# Patient Record
Sex: MALE | Race: WHITE | Employment: FULL TIME | URBAN - METROPOLITAN AREA
[De-identification: names, ages, dates, MRNs, and addresses within clinical notes are randomized per-mention and may not be internally consistent; named-entity substitution may affect disease eponyms.]

---

## 2017-01-28 DIAGNOSIS — F41.1 GENERALIZED ANXIETY DISORDER: Primary | ICD-10-CM

## 2017-01-29 NOTE — TELEPHONE ENCOUNTER
CITALOPRAM 20 MG TABLETS     Last Written Prescription Date: 12/20/2016  Last Fill Quantity: 30, # refills: 0  Last Office Visit with G primary care provider:  5/13/2016 DARRYL HOLLEY        Last PHQ-9 score on record= No flowsheet data found.

## 2017-01-30 RX ORDER — CITALOPRAM HYDROBROMIDE 20 MG/1
20 TABLET ORAL DAILY
Qty: 30 TABLET | Refills: 0 | Status: CANCELLED | OUTPATIENT
Start: 2017-01-30

## 2017-01-30 NOTE — TELEPHONE ENCOUNTER
"Per 5/13/2016 visit \"Decrease celexa to 10mg by mouth daily for 1 month. If anxiety symptoms are stable, could consider discontinuing after that time.  Could consider 5mg dose for one month if symptoms are uncomfortable discontinuing from 10mg daily.\" when did you want her to follow up?  "

## 2017-01-30 NOTE — TELEPHONE ENCOUNTER
Please clarify if patient is actually taking this medication.    Chris Williamson MD  Family Medicine Physician

## 2017-02-21 NOTE — TELEPHONE ENCOUNTER
Pt is taking citalopram. He works for [employer]. He had an appt there for a wellness checkup beginning of February. It was filled for 3 months.     He agrees to make appt with Dr Noe Williamson before he runs out of citalopram and still considers him his PCP.    Pretty Reagan, RN, BSN

## 2017-03-07 ENCOUNTER — TRANSFERRED RECORDS (OUTPATIENT)
Dept: HEALTH INFORMATION MANAGEMENT | Facility: CLINIC | Age: 37
End: 2017-03-07

## 2017-03-20 ENCOUNTER — TRANSFERRED RECORDS (OUTPATIENT)
Dept: HEALTH INFORMATION MANAGEMENT | Facility: CLINIC | Age: 37
End: 2017-03-20

## 2017-03-24 ENCOUNTER — TRANSFERRED RECORDS (OUTPATIENT)
Dept: HEALTH INFORMATION MANAGEMENT | Facility: CLINIC | Age: 37
End: 2017-03-24

## 2017-04-24 ENCOUNTER — OFFICE VISIT (OUTPATIENT)
Dept: FAMILY MEDICINE | Facility: CLINIC | Age: 37
End: 2017-04-24
Payer: OTHER MISCELLANEOUS

## 2017-04-24 VITALS
RESPIRATION RATE: 16 BRPM | WEIGHT: 200 LBS | OXYGEN SATURATION: 99 % | DIASTOLIC BLOOD PRESSURE: 76 MMHG | TEMPERATURE: 98 F | SYSTOLIC BLOOD PRESSURE: 110 MMHG | HEIGHT: 68 IN | BODY MASS INDEX: 30.31 KG/M2 | HEART RATE: 72 BPM

## 2017-04-24 DIAGNOSIS — M51.9 LUMBAR DISC DISEASE: ICD-10-CM

## 2017-04-24 DIAGNOSIS — Z01.818 PREOP GENERAL PHYSICAL EXAM: Primary | ICD-10-CM

## 2017-04-24 PROCEDURE — 99214 OFFICE O/P EST MOD 30 MIN: CPT | Performed by: FAMILY MEDICINE

## 2017-04-24 NOTE — PROGRESS NOTES
[address] [address]  [phone]  Dept: [phone]    PRE-OP EVALUATION:  Today's date: 2017    Emile Navas (: 1980) presents for pre-operative evaluation assessment as requested by Rogelio Paiz.  He requires evaluation and anesthesia risk assessment prior to undergoing surgery/procedure for treatment of Laminectomy Lumbar One Level .  Proposed procedure: Revision right L5-S1 Disectomy (Alejo Oleary, C-ARM)        Date of Surgery/ Procedure: 2017  Time of Surgery/ Procedure: 9:00 AM  Hospital/Surgical Facility: [facility]  Fax number for surgical facility: N/A  Primary Physician: Chris Mackey  Type of Anesthesia Anticipated: General    Patient has a Health Care Directive or Living Will:  NO    Preop Questions 2017   1.  Do you have a history of heart attack, stroke, stent, bypass or surgery on an artery in the head, neck, heart or legs? No   2.  Do you ever have any pain or discomfort in your chest? No   3.  Do you have a history of  Heart Failure? No   4.   Are you troubled by shortness of breath when:  walking on a level surface, or up a slight hill, or at night? No   5.  Do you currently have a cold, bronchitis or other respiratory infection? No   6.  Do you have a cough, shortness of breath, or wheezing? No   7.  Do you sometimes get pains in the calves of your legs when you walk? No   8. Do you or anyone in your family have previous history of blood clots? No   9.  Do you or does anyone in your family have a serious bleeding problem such as prolonged bleeding following surgeries or cuts? No   10. Have you ever had problems with anemia or been told to take iron pills? No   11. Have you had any abnormal blood loss such as black, tarry or bloody stools? No   12. Have you ever had a blood transfusion? No   13. Have you or any of your relatives ever had problems with anesthesia? No   14. Do you have sleep  apnea, excessive snoring or daytime drowsiness? No   15. Do you have any prosthetic heart valves? No   16. Do you have prosthetic joints? No         HPI:                                                      Brief HPI related to upcoming procedure:   Was injured when lifting a women from a roadway 2/20/2017. Hasn't been the same since.        MEDICAL HISTORY:                                                      Patient Active Problem List    Diagnosis Date Noted     Lumbar disc disease 02/04/2016     Priority: Medium     Generalized anxiety disorder 12/18/2014     Priority: Medium     Diagnosis updated by automated process. Provider to review and confirm.        History reviewed. No pertinent past medical history.  History reviewed. No pertinent surgical history.  Current Outpatient Prescriptions   Medication Sig Dispense Refill     traMADol (ULTRAM) 50 MG tablet Take 0.5-1 tablets (25-50 mg) by mouth daily as needed for moderate pain 30 tablet 0     citalopram (CELEXA) 20 MG tablet Take 1 tablet (20 mg) by mouth daily 30 tablet 11     triamcinolone (KENALOG) 0.1 % cream Apply sparingly to affected area three times daily for 14 days. (Patient not taking: Reported on 4/24/2017) 30 g 5     OTC products: None, except as noted above    No Known Allergies   Latex Allergy: NO    Social History   Substance Use Topics     Smoking status: Never Smoker     Smokeless tobacco: Not on file     Alcohol use Yes      Comment: rarely     History   Drug Use No       REVIEW OF SYSTEMS:                                                    C: NEGATIVE for fever, chills, change in weight  I: NEGATIVE for worrisome rashes, moles or lesions  E: NEGATIVE for vision changes or irritation  E/M: NEGATIVE for ear, mouth and throat problems  R: NEGATIVE for significant cough or SOB  CV: NEGATIVE for chest pain, palpitations or peripheral edema  GI: NEGATIVE for nausea, abdominal pain, heartburn, or change in bowel habits  : NEGATIVE for  "frequency, dysuria, or hematuria  M: Back pain, and ongoing right wrist pain.  N: Does have weakness in his right leg.  H: NEGATIVE for bleeding problems  P: NEGATIVE for changes in mood or affect    EXAM:                                                    /76 (BP Location: Left arm, Patient Position: Chair, Cuff Size: Adult Large)  Pulse 72  Temp 98  F (36.7  C) (Oral)  Resp 16  Ht 5' 7.5\" (1.715 m)  Wt 200 lb (90.7 kg)  SpO2 99%  BMI 30.86 kg/m2    GENERAL APPEARANCE: healthy, alert and no distress     EYES: EOMI,  PERRL     HENT: ear canals and TM's normal and nose and mouth without ulcers or lesions     NECK: no adenopathy, no asymmetry, masses, or scars and thyroid normal to palpation     RESP: lungs clear to auscultation - no rales, rhonchi or wheezes     CV: regular rates and rhythm, normal S1 S2, no S3 or S4 and no murmur, click or rub     ABDOMEN:  soft, nontender, no HSM or masses and bowel sounds normal     MS: extremities normal- no gross deformities noted, no evidence of inflammation in joints, FROM in all extremities.     SKIN: no suspicious lesions or rashes     NEURO: Normal strength and tone, sensory exam grossly normal, mentation intact and speech normal     PSYCH: mentation appears normal. and affect normal/bright     LYMPHATICS: No axillary, cervical, or supraclavicular nodes    DIAGNOSTICS:                                                    No labs or EKG required for low risk surgery (cataract, skin procedure, breast biopsy, etc)    Recent Labs   Lab Test  03/03/16   1510  09/04/11   0744  09/03/11   0145   HGB  14.9  14.6  14.1   PLT   --   225  229   NA   --   142  144   POTASSIUM   --   4.4  3.7   CR   --   0.90  0.90        IMPRESSION:                                                    Reason for surgery/procedure: radiculopathy secondary to disc bulge  Diagnosis/reason for consult: preop    The proposed surgical procedure is considered INTERMEDIATE risk.    REVISED CARDIAC " RISK INDEX  The patient has the following serious cardiovascular risks for perioperative complications such as (MI, PE, VFib and 3  AV Block):  No serious cardiac risks  INTERPRETATION: 1 risks: Class II (low risk - 0.9% complication rate)    The patient has the following additional risks for perioperative complications:  No identified additional risks      ICD-10-CM    1. Preop general physical exam Z01.818    2. Lumbar disc disease M51.9        RECOMMENDATIONS:                                                      APPROVAL GIVEN to proceed with proposed procedure, without further diagnostic evaluation       Signed Electronically by: Chris Williamson MD    Copy of this evaluation report is provided to requesting physician.    [facility] Preop Guidelines

## 2017-04-24 NOTE — NURSING NOTE
"Chief Complaint   Patient presents with     Pre-Op Exam       Initial /76 (BP Location: Left arm, Patient Position: Chair, Cuff Size: Adult Large)  Pulse 72  Temp 98  F (36.7  C) (Oral)  Resp 16  Ht 5' 7.5\" (1.715 m)  Wt 200 lb (90.7 kg)  SpO2 99%  BMI 30.86 kg/m2 Estimated body mass index is 30.86 kg/(m^2) as calculated from the following:    Height as of this encounter: 5' 7.5\" (1.715 m).    Weight as of this encounter: 200 lb (90.7 kg).  Medication Reconciliation: complete     Kim Galaviz CMA      "

## 2017-04-24 NOTE — MR AVS SNAPSHOT
After Visit Summary   4/24/2017    Emile Navas    MRN: [id number]           Patient Information     Date Of Birth          1980        Visit Information        Provider Department      4/24/2017 11:00 AM Chris Mackey MD [facility]        Today's Diagnoses     Preop general physical exam    -  1    Lumbar disc disease          Care Instructions      Before Your Surgery      Call your surgeon if there is any change in your health. This includes signs of a cold or flu (such as a sore throat, runny nose, cough, rash or fever).    Do not smoke, drink alcohol or take over the counter medicine (unless your surgeon or primary care doctor tells you to) for the 24 hours before and after surgery.    If you take prescribed drugs: Follow your doctor s orders about which medicines to take and which to stop until after surgery.    Eating and drinking prior to surgery: follow the instructions from your surgeon    Take a shower or bath the night before surgery. Use the soap your surgeon gave you to gently clean your skin. If you do not have soap from your surgeon, use your regular soap. Do not shave or scrub the surgery site.  Wear clean pajamas and have clean sheets on your bed.         Follow-ups after your visit        Your next 10 appointments already scheduled     Apr 26, 2017   Procedure with Rogelio Dixon MD   [facility] (--)    [address]  [facility] [id number] [phone]              Who to contact     If you have questions or need follow up information about today's clinic visit or your schedule please contact [facility] directly at [phone].  Normal or non-critical lab and imaging results will be communicated to you by MyChart, letter or phone within 4 business days after the clinic has received the results. If you do not hear from us within 7 days, please contact the clinic through [employer]  "or phone. If you have a critical or abnormal lab result, we will notify you by phone as soon as possible.  Submit refill requests through [employer] or call your pharmacy and they will forward the refill request to us. Please allow 3 business days for your refill to be completed.          Additional Information About Your Visit        [employer] Information     [employer] gives you secure access to your electronic health record. If you see a primary care provider, you can also send messages to your care team and make appointments. If you have questions, please call your primary care clinic.  If you do not have a primary care provider, please call [phone] and they will assist you.        Care EveryWhere ID     This is your Care EveryWhere ID. This could be used by other organizations to access your [facility] medical records  [id number]        Your Vitals Were     Pulse Temperature Respirations Height Pulse Oximetry BMI (Body Mass Index)    72 98  F (36.7  C) (Oral) 16 5' 7.5\" (1.715 m) 99% 30.86 kg/m2       Blood Pressure from Last 3 Encounters:   04/24/17 110/76   05/13/16 128/84   03/03/16 133/81    Weight from Last 3 Encounters:   04/24/17 200 lb (90.7 kg)   05/13/16 208 lb (94.3 kg)   03/03/16 200 lb 3.2 oz (90.8 kg)              Today, you had the following     No orders found for display       Primary Care Provider Office Phone # Fax #    Chris Ruano Noe Williamson [phone]       [address]  [facility] [address]        Thank you!     Thank you for choosing [facility]  for your care. Our goal is always to provide you with excellent care. Hearing back from our patients is one way we can continue to improve our services. Please take a few minutes to complete the written survey that you may receive in the mail after your visit with us. Thank you!             Your Updated Medication List - Protect others around you: Learn how to safely use, " store and throw away your medicines at www.disposemymeds.org.          This list is accurate as of: 4/24/17  1:15 PM.  Always use your most recent med list.                   Brand Name Dispense Instructions for use    citalopram 20 MG tablet    celeXA    30 tablet    Take 1 tablet (20 mg) by mouth daily       traMADol 50 MG tablet    ULTRAM    30 tablet    Take 0.5-1 tablets (25-50 mg) by mouth daily as needed for moderate pain       triamcinolone 0.1 % cream    KENALOG    30 g    Apply sparingly to affected area three times daily for 14 days.

## 2017-04-25 NOTE — H&P (VIEW-ONLY)
[address] [address]  [phone]  Dept: [phone]    PRE-OP EVALUATION:  Today's date: 2017    Emile Navas (: 1980) presents for pre-operative evaluation assessment as requested by Rogelio Paiz.  He requires evaluation and anesthesia risk assessment prior to undergoing surgery/procedure for treatment of Laminectomy Lumbar One Level .  Proposed procedure: Revision right L5-S1 Disectomy (Alejo Oleary, C-ARM)        Date of Surgery/ Procedure: 2017  Time of Surgery/ Procedure: 9:00 AM  Hospital/Surgical Facility: [facility]  Fax number for surgical facility: N/A  Primary Physician: Chris Mackey  Type of Anesthesia Anticipated: General    Patient has a Health Care Directive or Living Will:  NO    Preop Questions 2017   1.  Do you have a history of heart attack, stroke, stent, bypass or surgery on an artery in the head, neck, heart or legs? No   2.  Do you ever have any pain or discomfort in your chest? No   3.  Do you have a history of  Heart Failure? No   4.   Are you troubled by shortness of breath when:  walking on a level surface, or up a slight hill, or at night? No   5.  Do you currently have a cold, bronchitis or other respiratory infection? No   6.  Do you have a cough, shortness of breath, or wheezing? No   7.  Do you sometimes get pains in the calves of your legs when you walk? No   8. Do you or anyone in your family have previous history of blood clots? No   9.  Do you or does anyone in your family have a serious bleeding problem such as prolonged bleeding following surgeries or cuts? No   10. Have you ever had problems with anemia or been told to take iron pills? No   11. Have you had any abnormal blood loss such as black, tarry or bloody stools? No   12. Have you ever had a blood transfusion? No   13. Have you or any of your relatives ever had problems with anesthesia? No   14. Do you have sleep  apnea, excessive snoring or daytime drowsiness? No   15. Do you have any prosthetic heart valves? No   16. Do you have prosthetic joints? No         HPI:                                                      Brief HPI related to upcoming procedure:   Was injured when lifting a women from a roadway 2/20/2017. Hasn't been the same since.        MEDICAL HISTORY:                                                      Patient Active Problem List    Diagnosis Date Noted     Lumbar disc disease 02/04/2016     Priority: Medium     Generalized anxiety disorder 12/18/2014     Priority: Medium     Diagnosis updated by automated process. Provider to review and confirm.        History reviewed. No pertinent past medical history.  History reviewed. No pertinent surgical history.  Current Outpatient Prescriptions   Medication Sig Dispense Refill     traMADol (ULTRAM) 50 MG tablet Take 0.5-1 tablets (25-50 mg) by mouth daily as needed for moderate pain 30 tablet 0     citalopram (CELEXA) 20 MG tablet Take 1 tablet (20 mg) by mouth daily 30 tablet 11     triamcinolone (KENALOG) 0.1 % cream Apply sparingly to affected area three times daily for 14 days. (Patient not taking: Reported on 4/24/2017) 30 g 5     OTC products: None, except as noted above    No Known Allergies   Latex Allergy: NO    Social History   Substance Use Topics     Smoking status: Never Smoker     Smokeless tobacco: Not on file     Alcohol use Yes      Comment: rarely     History   Drug Use No       REVIEW OF SYSTEMS:                                                    C: NEGATIVE for fever, chills, change in weight  I: NEGATIVE for worrisome rashes, moles or lesions  E: NEGATIVE for vision changes or irritation  E/M: NEGATIVE for ear, mouth and throat problems  R: NEGATIVE for significant cough or SOB  CV: NEGATIVE for chest pain, palpitations or peripheral edema  GI: NEGATIVE for nausea, abdominal pain, heartburn, or change in bowel habits  : NEGATIVE for  "frequency, dysuria, or hematuria  M: Back pain, and ongoing right wrist pain.  N: Does have weakness in his right leg.  H: NEGATIVE for bleeding problems  P: NEGATIVE for changes in mood or affect    EXAM:                                                    /76 (BP Location: Left arm, Patient Position: Chair, Cuff Size: Adult Large)  Pulse 72  Temp 98  F (36.7  C) (Oral)  Resp 16  Ht 5' 7.5\" (1.715 m)  Wt 200 lb (90.7 kg)  SpO2 99%  BMI 30.86 kg/m2    GENERAL APPEARANCE: healthy, alert and no distress     EYES: EOMI,  PERRL     HENT: ear canals and TM's normal and nose and mouth without ulcers or lesions     NECK: no adenopathy, no asymmetry, masses, or scars and thyroid normal to palpation     RESP: lungs clear to auscultation - no rales, rhonchi or wheezes     CV: regular rates and rhythm, normal S1 S2, no S3 or S4 and no murmur, click or rub     ABDOMEN:  soft, nontender, no HSM or masses and bowel sounds normal     MS: extremities normal- no gross deformities noted, no evidence of inflammation in joints, FROM in all extremities.     SKIN: no suspicious lesions or rashes     NEURO: Normal strength and tone, sensory exam grossly normal, mentation intact and speech normal     PSYCH: mentation appears normal. and affect normal/bright     LYMPHATICS: No axillary, cervical, or supraclavicular nodes    DIAGNOSTICS:                                                    No labs or EKG required for low risk surgery (cataract, skin procedure, breast biopsy, etc)    Recent Labs   Lab Test  03/03/16   1510  09/04/11   0744  09/03/11   0145   HGB  14.9  14.6  14.1   PLT   --   225  229   NA   --   142  144   POTASSIUM   --   4.4  3.7   CR   --   0.90  0.90        IMPRESSION:                                                    Reason for surgery/procedure: radiculopathy secondary to disc bulge  Diagnosis/reason for consult: preop    The proposed surgical procedure is considered INTERMEDIATE risk.    REVISED CARDIAC " RISK INDEX  The patient has the following serious cardiovascular risks for perioperative complications such as (MI, PE, VFib and 3  AV Block):  No serious cardiac risks  INTERPRETATION: 1 risks: Class II (low risk - 0.9% complication rate)    The patient has the following additional risks for perioperative complications:  No identified additional risks      ICD-10-CM    1. Preop general physical exam Z01.818    2. Lumbar disc disease M51.9        RECOMMENDATIONS:                                                      APPROVAL GIVEN to proceed with proposed procedure, without further diagnostic evaluation       Signed Electronically by: Chris Williamson MD    Copy of this evaluation report is provided to requesting physician.    [facility] Preop Guidelines

## 2017-04-26 ENCOUNTER — ANESTHESIA EVENT (OUTPATIENT)
Dept: SURGERY | Facility: CLINIC | Age: 37
End: 2017-04-26
Payer: OTHER MISCELLANEOUS

## 2017-04-26 ENCOUNTER — HOSPITAL ENCOUNTER (OUTPATIENT)
Facility: CLINIC | Age: 37
Discharge: HOME | End: 2017-04-26
Attending: ORTHOPAEDIC SURGERY | Admitting: ORTHOPAEDIC SURGERY
Payer: OTHER MISCELLANEOUS

## 2017-04-26 ENCOUNTER — ANESTHESIA (OUTPATIENT)
Dept: SURGERY | Facility: CLINIC | Age: 37
End: 2017-04-26
Payer: OTHER MISCELLANEOUS

## 2017-04-26 ENCOUNTER — APPOINTMENT (OUTPATIENT)
Dept: GENERAL RADIOLOGY | Facility: CLINIC | Age: 37
End: 2017-04-26
Attending: ORTHOPAEDIC SURGERY
Payer: OTHER MISCELLANEOUS

## 2017-04-26 VITALS
DIASTOLIC BLOOD PRESSURE: 62 MMHG | HEIGHT: 68 IN | BODY MASS INDEX: 30.31 KG/M2 | WEIGHT: 200 LBS | TEMPERATURE: 97.5 F | RESPIRATION RATE: 16 BRPM | SYSTOLIC BLOOD PRESSURE: 121 MMHG | OXYGEN SATURATION: 99 %

## 2017-04-26 DIAGNOSIS — Z98.890 S/P LUMBAR DISCECTOMY: Primary | ICD-10-CM

## 2017-04-26 PROCEDURE — 37000009 ZZH ANESTHESIA TECHNICAL FEE, EACH ADDTL 15 MIN: Performed by: ORTHOPAEDIC SURGERY

## 2017-04-26 PROCEDURE — 25000128 H RX IP 250 OP 636: Performed by: ANESTHESIOLOGY

## 2017-04-26 PROCEDURE — 71000027 ZZH RECOVERY PHASE 2 EACH 15 MINS: Performed by: ORTHOPAEDIC SURGERY

## 2017-04-26 PROCEDURE — 25000128 H RX IP 250 OP 636: Performed by: NURSE ANESTHETIST, CERTIFIED REGISTERED

## 2017-04-26 PROCEDURE — 27210995 ZZH RX 272: Performed by: ORTHOPAEDIC SURGERY

## 2017-04-26 PROCEDURE — 25800025 ZZH RX 258: Performed by: NURSE ANESTHETIST, CERTIFIED REGISTERED

## 2017-04-26 PROCEDURE — 25000132 ZZH RX MED GY IP 250 OP 250 PS 637: Performed by: ORTHOPAEDIC SURGERY

## 2017-04-26 PROCEDURE — 40000170 ZZH STATISTIC PRE-PROCEDURE ASSESSMENT II: Performed by: ORTHOPAEDIC SURGERY

## 2017-04-26 PROCEDURE — 25000128 H RX IP 250 OP 636: Performed by: PHYSICIAN ASSISTANT

## 2017-04-26 PROCEDURE — 37000008 ZZH ANESTHESIA TECHNICAL FEE, 1ST 30 MIN: Performed by: ORTHOPAEDIC SURGERY

## 2017-04-26 PROCEDURE — 25000125 ZZHC RX 250: Performed by: ORTHOPAEDIC SURGERY

## 2017-04-26 PROCEDURE — 71000012 ZZH RECOVERY PHASE 1 LEVEL 1 FIRST HR: Performed by: ORTHOPAEDIC SURGERY

## 2017-04-26 PROCEDURE — 36000065 ZZH SURGERY LEVEL 4 W FLUORO 1ST 30 MIN: Performed by: ORTHOPAEDIC SURGERY

## 2017-04-26 PROCEDURE — 25000128 H RX IP 250 OP 636: Performed by: ORTHOPAEDIC SURGERY

## 2017-04-26 PROCEDURE — 27210794 ZZH OR GENERAL SUPPLY STERILE: Performed by: ORTHOPAEDIC SURGERY

## 2017-04-26 PROCEDURE — 25000125 ZZHC RX 250: Performed by: NURSE ANESTHETIST, CERTIFIED REGISTERED

## 2017-04-26 PROCEDURE — 71000013 ZZH RECOVERY PHASE 1 LEVEL 1 EA ADDTL HR: Performed by: ORTHOPAEDIC SURGERY

## 2017-04-26 PROCEDURE — 25000566 ZZH SEVOFLURANE, EA 15 MIN: Performed by: ORTHOPAEDIC SURGERY

## 2017-04-26 PROCEDURE — 40000277 XR SURGERY CARM FLUORO LESS THAN 5 MIN W STILLS

## 2017-04-26 PROCEDURE — 36000063 ZZH SURGERY LEVEL 4 EA 15 ADDTL MIN: Performed by: ORTHOPAEDIC SURGERY

## 2017-04-26 RX ORDER — SODIUM CHLORIDE, SODIUM LACTATE, POTASSIUM CHLORIDE, CALCIUM CHLORIDE 600; 310; 30; 20 MG/100ML; MG/100ML; MG/100ML; MG/100ML
INJECTION, SOLUTION INTRAVENOUS CONTINUOUS
Status: DISCONTINUED | OUTPATIENT
Start: 2017-04-26 | End: 2017-04-26 | Stop reason: HOSPADM

## 2017-04-26 RX ORDER — DEXAMETHASONE SODIUM PHOSPHATE 4 MG/ML
INJECTION, SOLUTION INTRA-ARTICULAR; INTRALESIONAL; INTRAMUSCULAR; INTRAVENOUS; SOFT TISSUE PRN
Status: DISCONTINUED | OUTPATIENT
Start: 2017-04-26 | End: 2017-04-26

## 2017-04-26 RX ORDER — BETAMETHASONE SODIUM PHOSPHATE AND BETAMETHASONE ACETATE 3; 3 MG/ML; MG/ML
INJECTION, SUSPENSION INTRA-ARTICULAR; INTRALESIONAL; INTRAMUSCULAR; SOFT TISSUE PRN
Status: DISCONTINUED | OUTPATIENT
Start: 2017-04-26 | End: 2017-04-26 | Stop reason: HOSPADM

## 2017-04-26 RX ORDER — LIDOCAINE HYDROCHLORIDE 20 MG/ML
INJECTION, SOLUTION INFILTRATION; PERINEURAL PRN
Status: DISCONTINUED | OUTPATIENT
Start: 2017-04-26 | End: 2017-04-26

## 2017-04-26 RX ORDER — OXYCODONE HYDROCHLORIDE 5 MG/1
5 TABLET ORAL EVERY 4 HOURS PRN
Qty: 40 TABLET | Refills: 0 | Status: SHIPPED | OUTPATIENT
Start: 2017-04-26 | End: 2017-06-27

## 2017-04-26 RX ORDER — EPHEDRINE SULFATE 50 MG/ML
INJECTION, SOLUTION INTRAMUSCULAR; INTRAVENOUS; SUBCUTANEOUS PRN
Status: DISCONTINUED | OUTPATIENT
Start: 2017-04-26 | End: 2017-04-26

## 2017-04-26 RX ORDER — MEPERIDINE HYDROCHLORIDE 25 MG/ML
12.5 INJECTION INTRAMUSCULAR; INTRAVENOUS; SUBCUTANEOUS
Status: DISCONTINUED | OUTPATIENT
Start: 2017-04-26 | End: 2017-04-26 | Stop reason: HOSPADM

## 2017-04-26 RX ORDER — PROPOFOL 10 MG/ML
INJECTION, EMULSION INTRAVENOUS PRN
Status: DISCONTINUED | OUTPATIENT
Start: 2017-04-26 | End: 2017-04-26

## 2017-04-26 RX ORDER — ONDANSETRON 2 MG/ML
4 INJECTION INTRAMUSCULAR; INTRAVENOUS EVERY 30 MIN PRN
Status: DISCONTINUED | OUTPATIENT
Start: 2017-04-26 | End: 2017-04-26 | Stop reason: HOSPADM

## 2017-04-26 RX ORDER — FENTANYL CITRATE 0.05 MG/ML
25-50 INJECTION, SOLUTION INTRAMUSCULAR; INTRAVENOUS
Status: DISCONTINUED | OUTPATIENT
Start: 2017-04-26 | End: 2017-04-26 | Stop reason: HOSPADM

## 2017-04-26 RX ORDER — KETOROLAC TROMETHAMINE 30 MG/ML
30 INJECTION, SOLUTION INTRAMUSCULAR; INTRAVENOUS ONCE
Status: COMPLETED | OUTPATIENT
Start: 2017-04-26 | End: 2017-04-26

## 2017-04-26 RX ORDER — CEFAZOLIN SODIUM 1 G/3ML
1 INJECTION, POWDER, FOR SOLUTION INTRAMUSCULAR; INTRAVENOUS SEE ADMIN INSTRUCTIONS
Status: DISCONTINUED | OUTPATIENT
Start: 2017-04-26 | End: 2017-04-26 | Stop reason: HOSPADM

## 2017-04-26 RX ORDER — GLYCOPYRROLATE 0.2 MG/ML
INJECTION, SOLUTION INTRAMUSCULAR; INTRAVENOUS PRN
Status: DISCONTINUED | OUTPATIENT
Start: 2017-04-26 | End: 2017-04-26

## 2017-04-26 RX ORDER — ONDANSETRON 4 MG/1
4 TABLET, ORALLY DISINTEGRATING ORAL EVERY 30 MIN PRN
Status: DISCONTINUED | OUTPATIENT
Start: 2017-04-26 | End: 2017-04-26 | Stop reason: HOSPADM

## 2017-04-26 RX ORDER — CEFAZOLIN SODIUM 2 G/100ML
2 INJECTION, SOLUTION INTRAVENOUS
Status: COMPLETED | OUTPATIENT
Start: 2017-04-26 | End: 2017-04-26

## 2017-04-26 RX ORDER — HYDRALAZINE HYDROCHLORIDE 20 MG/ML
2.5-5 INJECTION INTRAMUSCULAR; INTRAVENOUS EVERY 10 MIN PRN
Status: DISCONTINUED | OUTPATIENT
Start: 2017-04-26 | End: 2017-04-26 | Stop reason: HOSPADM

## 2017-04-26 RX ORDER — NALOXONE HYDROCHLORIDE 0.4 MG/ML
.1-.4 INJECTION, SOLUTION INTRAMUSCULAR; INTRAVENOUS; SUBCUTANEOUS
Status: DISCONTINUED | OUTPATIENT
Start: 2017-04-26 | End: 2017-04-26 | Stop reason: HOSPADM

## 2017-04-26 RX ORDER — OXYCODONE HYDROCHLORIDE 5 MG/1
5 TABLET ORAL ONCE
Status: COMPLETED | OUTPATIENT
Start: 2017-04-26 | End: 2017-04-26

## 2017-04-26 RX ORDER — VECURONIUM BROMIDE 1 MG/ML
INJECTION, POWDER, LYOPHILIZED, FOR SOLUTION INTRAVENOUS PRN
Status: DISCONTINUED | OUTPATIENT
Start: 2017-04-26 | End: 2017-04-26

## 2017-04-26 RX ORDER — ONDANSETRON 2 MG/ML
INJECTION INTRAMUSCULAR; INTRAVENOUS PRN
Status: DISCONTINUED | OUTPATIENT
Start: 2017-04-26 | End: 2017-04-26

## 2017-04-26 RX ORDER — ALBUTEROL SULFATE 0.83 MG/ML
2.5 SOLUTION RESPIRATORY (INHALATION) EVERY 4 HOURS PRN
Status: DISCONTINUED | OUTPATIENT
Start: 2017-04-26 | End: 2017-04-26 | Stop reason: HOSPADM

## 2017-04-26 RX ORDER — SODIUM CHLORIDE, SODIUM LACTATE, POTASSIUM CHLORIDE, CALCIUM CHLORIDE 600; 310; 30; 20 MG/100ML; MG/100ML; MG/100ML; MG/100ML
INJECTION, SOLUTION INTRAVENOUS CONTINUOUS PRN
Status: DISCONTINUED | OUTPATIENT
Start: 2017-04-26 | End: 2017-04-26

## 2017-04-26 RX ORDER — NEOSTIGMINE METHYLSULFATE 1 MG/ML
VIAL (ML) INJECTION PRN
Status: DISCONTINUED | OUTPATIENT
Start: 2017-04-26 | End: 2017-04-26

## 2017-04-26 RX ORDER — FENTANYL CITRATE 50 UG/ML
INJECTION, SOLUTION INTRAMUSCULAR; INTRAVENOUS PRN
Status: DISCONTINUED | OUTPATIENT
Start: 2017-04-26 | End: 2017-04-26

## 2017-04-26 RX ADMIN — Medication 5 MG: at 09:47

## 2017-04-26 RX ADMIN — KETOROLAC TROMETHAMINE 30 MG: 30 INJECTION, SOLUTION INTRAMUSCULAR at 10:49

## 2017-04-26 RX ADMIN — NEOSTIGMINE METHYLSULFATE 5 MG: 1 INJECTION INTRAMUSCULAR; INTRAVENOUS; SUBCUTANEOUS at 10:21

## 2017-04-26 RX ADMIN — ONDANSETRON 4 MG: 2 INJECTION INTRAMUSCULAR; INTRAVENOUS at 10:11

## 2017-04-26 RX ADMIN — ROCURONIUM BROMIDE 50 MG: 10 INJECTION INTRAVENOUS at 08:42

## 2017-04-26 RX ADMIN — PHENYLEPHRINE HYDROCHLORIDE 50 MCG: 10 INJECTION, SOLUTION INTRAMUSCULAR; INTRAVENOUS; SUBCUTANEOUS at 10:06

## 2017-04-26 RX ADMIN — Medication 5 MG: at 09:34

## 2017-04-26 RX ADMIN — OXYCODONE HYDROCHLORIDE 5 MG: 5 TABLET ORAL at 11:55

## 2017-04-26 RX ADMIN — PHENYLEPHRINE HYDROCHLORIDE 50 MCG: 10 INJECTION, SOLUTION INTRAMUSCULAR; INTRAVENOUS; SUBCUTANEOUS at 09:47

## 2017-04-26 RX ADMIN — LIDOCAINE HYDROCHLORIDE 100 MG: 20 INJECTION, SOLUTION INFILTRATION; PERINEURAL at 08:42

## 2017-04-26 RX ADMIN — GLYCOPYRROLATE 0.8 MG: 0.2 INJECTION, SOLUTION INTRAMUSCULAR; INTRAVENOUS at 10:21

## 2017-04-26 RX ADMIN — Medication 5 MG: at 09:42

## 2017-04-26 RX ADMIN — HYDROMORPHONE HYDROCHLORIDE 0.5 MG: 1 INJECTION, SOLUTION INTRAMUSCULAR; INTRAVENOUS; SUBCUTANEOUS at 11:27

## 2017-04-26 RX ADMIN — PHENYLEPHRINE HYDROCHLORIDE 100 MCG: 10 INJECTION, SOLUTION INTRAMUSCULAR; INTRAVENOUS; SUBCUTANEOUS at 10:21

## 2017-04-26 RX ADMIN — PROPOFOL 230 MG: 10 INJECTION, EMULSION INTRAVENOUS at 08:42

## 2017-04-26 RX ADMIN — PHENYLEPHRINE HYDROCHLORIDE 50 MCG: 10 INJECTION, SOLUTION INTRAMUSCULAR; INTRAVENOUS; SUBCUTANEOUS at 09:15

## 2017-04-26 RX ADMIN — PHENYLEPHRINE HYDROCHLORIDE 50 MCG: 10 INJECTION, SOLUTION INTRAMUSCULAR; INTRAVENOUS; SUBCUTANEOUS at 09:13

## 2017-04-26 RX ADMIN — DEXMEDETOMIDINE HYDROCHLORIDE 0.5 MCG/KG/HR: 100 INJECTION, SOLUTION INTRAVENOUS at 08:48

## 2017-04-26 RX ADMIN — CEFAZOLIN SODIUM 2 G: 2 INJECTION, SOLUTION INTRAVENOUS at 08:52

## 2017-04-26 RX ADMIN — FENTANYL CITRATE 100 MCG: 50 INJECTION, SOLUTION INTRAMUSCULAR; INTRAVENOUS at 08:39

## 2017-04-26 RX ADMIN — VECURONIUM BROMIDE 2 MG: 1 INJECTION, POWDER, LYOPHILIZED, FOR SOLUTION INTRAVENOUS at 09:33

## 2017-04-26 RX ADMIN — MIDAZOLAM HYDROCHLORIDE 2 MG: 1 INJECTION, SOLUTION INTRAMUSCULAR; INTRAVENOUS at 08:39

## 2017-04-26 RX ADMIN — Medication 5 MG: at 10:03

## 2017-04-26 RX ADMIN — DEXAMETHASONE SODIUM PHOSPHATE 4 MG: 4 INJECTION, SOLUTION INTRA-ARTICULAR; INTRALESIONAL; INTRAMUSCULAR; INTRAVENOUS; SOFT TISSUE at 09:03

## 2017-04-26 RX ADMIN — SODIUM CHLORIDE, POTASSIUM CHLORIDE, SODIUM LACTATE AND CALCIUM CHLORIDE: 600; 310; 30; 20 INJECTION, SOLUTION INTRAVENOUS at 08:39

## 2017-04-26 RX ADMIN — FENTANYL CITRATE 150 MCG: 50 INJECTION, SOLUTION INTRAMUSCULAR; INTRAVENOUS at 09:03

## 2017-04-26 RX ADMIN — PHENYLEPHRINE HYDROCHLORIDE 100 MCG: 10 INJECTION, SOLUTION INTRAMUSCULAR; INTRAVENOUS; SUBCUTANEOUS at 09:31

## 2017-04-26 RX ADMIN — PHENYLEPHRINE HYDROCHLORIDE 50 MCG: 10 INJECTION, SOLUTION INTRAMUSCULAR; INTRAVENOUS; SUBCUTANEOUS at 09:24

## 2017-04-26 NOTE — ANESTHESIA POSTPROCEDURE EVALUATION
Patient: Emile Navas    Procedure(s):  REVISION RIGHT L5-S1 DISCECTOMY  - Wound Class: I-Clean    Diagnosis:RECURRENT RIGHT L5-S1 DISC HERNIATION   Diagnosis Additional Information: No value filed.    Anesthesia Type:  General, ETT    Note:  Anesthesia Post Evaluation    Patient location during evaluation: PACU  Patient participation: Able to fully participate in evaluation  Level of consciousness: awake  Pain management: adequate  Airway patency: patent  Cardiovascular status: acceptable  Respiratory status: acceptable  Hydration status: acceptable  PONV: none     Anesthetic complications: None          Last vitals:  Vitals:    04/26/17 0809 04/26/17 1037   BP: 138/78 126/58   Resp: 16 12   Temp: 36.4  C (97.5  F) 36.4  C (97.5  F)   SpO2: 98% 98%         Electronically Signed By: Melania Velasquez MD, MD  April 26, 2017  11:04 AM

## 2017-04-26 NOTE — ANESTHESIA CARE TRANSFER NOTE
Patient: Emile Navas    Procedure(s):  REVISION RIGHT L5-S1 DISCECTOMY  - Wound Class: I-Clean    Diagnosis: RECURRENT RIGHT L5-S1 DISC HERNIATION   Diagnosis Additional Information: No value filed.    Anesthesia Type:   General, ETT     Note:  Airway :Face Mask  Patient transferred to:PACU  Comments: Patient spont ventilating. Adequate TV's. Opening eyes to voice and following commands. ETT suctioned and removed without issues. To PACU with O2. Vitals stable. Report given to RN.       Vitals: (Last set prior to Anesthesia Care Transfer)    CRNA VITALS  4/26/2017 1005 - 4/26/2017 1042      4/26/2017             NIBP: 118/76    Pulse: 77    NIBP Mean: 86    SpO2: 96 %    Resp Rate (observed): 16                Electronically Signed By: GORDON Hemphill CRNA  April 26, 2017  10:42 AM

## 2017-04-26 NOTE — OP NOTE
DATE OF PROCEDURE:  04/26/2017       PREOPERATIVE DIAGNOSIS:  Recurrent right L5-S1 disc herniation.      POSTOPERATIVE DIAGNOSIS:  Recurrent right L5-S1 disc herniation.      PROCEDURE:  Revision right L5-S1 discectomy      SURGEON:  Rogelio Dixon MD      ASSISTANT:  Cristiane Noland PA-C      ANESTHESIA:  General.      OPERATIVE DESCRIPTION:  Emile Navas was brought to the operating room.  A general anesthetic was administered by the Anesthesiology staff.  The patient was positioned prone on the Dhillon frame, carefully padded and positioned and his back was prepped and draped in routine sterile fashion.      The patient's previous midline skin incision was injected with 10 mL of 0.5% Marcaine with epinephrine.  After the timeout, a midline skin incision was made using the same incision.  A right-sided exposure at L5-S1 was performed.  A clamp was placed on the spinous processes of what was felt to be L5, and a lateral x-ray was obtained.  This confirmed that it was the spinous process of L5.  Exposure was carried down to the previous laminotomy site, and a Herbert retractor was placed.  Scar tissue was then mobilized from around the edge of the previous laminotomy site.  The scar tissue was not real thick.  It was somewhat more soft and fatty infiltrated.  Additional Marcaine was used in the skin, fascia and muscle.  A revision laminotomy was performed on the S1 lamina.  Using a , I was able to open up the space.  I released the scar tissue from around the edge of the laminotomy site, and then using a Kerrison, uncovered the S1 nerve root down to the level of the pedicle.  Then, working caudal to cranial direction, released the scar tissue from the edge of the laminotomy site and carried it up to the disc level.  I then took another lateral x-ray with a nerve hook over the disc.  This again confirmed our level.  We then mobilized the nerve root medially.  He had quite a bit of fat associated with  the undersurface of the dura that we could remove.  There was disk material right under the edge of the S1 nerve root which was removed.  It was somewhat adherent to the undersurface of the dura.  As I moved the nerve over to the midline, we did not find any additional free pieces of disc.  I made a cut in the annulus with a 15 blade, but the disk itself was very thin.  I could not get into the disc space with the acosta  pituitary, and I did not want to make a bigger hole.  At this point, we had a good decompression of the nerve.  I could palpate all along the undersurface of the nerve and the undersurface of the dural sac, and there were no additional fragments that could be found.  The wound was irrigated with antibiotic solution.  One final check of the S1 nerve root was performed.  I could look directly down onto the S1 nerve root, and there was no overhanging bone or lamina.  Celestone and Gelfoam were placed over the nerve root.  The retractors were removed.  Hemostasis was checked.  The wound was closed in routine fashion with interrupted #1 Vicryl suture for the deep fascia, 2-0 for the subcutaneous and 3-0 for the skin.  Dressings were applied, drapes removed.  He was transferred back to the hospital cart and taken to the recovery room in good condition.      ESTIMATED BLOOD LOSS:  10 mL.      COMPLICATIONS:  None.      DRAINS:  None.         SHAD STEPHENS MD             D: 2017 10:25   T: 2017 12:37   MT: JOSE [id number]      Name:     JOHNATHAN ZIEGLER   MRN:      [id number]        Account:        [id number]   :      1980           Procedure Date: 2017      Document: [id number]

## 2017-04-26 NOTE — BRIEF OP NOTE
[facility]  Spinal Surgery Brief Operative Note    Pre-operative diagnosis: RECURRENT RIGHT L5-S1 DISC HERNIATION    Post-operative diagnosis: Same   Procedure: REVISION RIGHT L5-S1 DECOMPRESSION AND DISCECTOMY   Surgeon: SHAD STEPHENS MD   Assistant(s): DIGNA VILLEGAS PA-C   Anesthesia: General endotracheal anesthesia   Estimated blood loss: 10 ml   Total IV fluids: (See anesthesia record)   Blood transfusion: NONE   Drains: None   Specimens: NONE   Implants: AS ABOVE   Findings: AS ABOVE   Complications: NONE   Condition: STABLE   Comments: SEE DICTATED OPERATIVE REPORT FOR DETAILS

## 2017-04-26 NOTE — ANESTHESIA PREPROCEDURE EVALUATION
Anesthesia Evaluation     . Pt has had prior anesthetic.     No history of anesthetic complications          ROS/MED HX    ENT/Pulmonary:      (-) sleep apnea and recent URI   Neurologic:     (+)other neuro Recurrent disc herniation    Cardiovascular:         METS/Exercise Tolerance:     Hematologic:         Musculoskeletal:         GI/Hepatic:        (-) GERD   Renal/Genitourinary:         Endo:         Psychiatric:         Infectious Disease:         Malignancy:         Other:                                    Anesthesia Plan      History & Physical Review  History and physical reviewed and following examination; no interval change.    ASA Status:  1 .    NPO Status:  > 8 hours    Plan for General and ETT with Intravenous induction. Maintenance will be Balanced.    PONV prophylaxis:  Ondansetron (or other 5HT-3) and Dexamethasone or Solumedrol       Postoperative Care  Postoperative pain management:  IV analgesics and Oral pain medications.      Consents  Anesthetic plan, risks, benefits and alternatives discussed with:  Patient..                        Procedure: Procedure(s):  LAMINECTOMY LUMBAR ONE LEVEL  Preop diagnosis: RECURRENT RIGHT L5-S1 DISC HERNIATION     No Known Allergies  No past medical history on file.  No past surgical history on file.  Prior to Admission medications    Medication Sig Start Date End Date Taking? Authorizing Provider   traMADol (ULTRAM) 50 MG tablet Take 0.5-1 tablets (25-50 mg) by mouth daily as needed for moderate pain 12/16/16   Chris Mackey MD   citalopram (CELEXA) 20 MG tablet Take 1 tablet (20 mg) by mouth daily 1/14/16   Chris Mackey MD   triamcinolone (KENALOG) 0.1 % cream Apply sparingly to affected area three times daily for 14 days.  Patient not taking: Reported on 4/24/2017 1/14/16   Chris Mackey MD     Current Facility-Administered Medications Ordered in Epic   Medication Dose Route Frequency Last Rate Last Dose      ceFAZolin sodium-dextrose (ANCEF) infusion 2 g  2 g Intravenous Pre-Op/Pre-procedure x 1 dose         ceFAZolin (ANCEF) 1 g vial to attach to  ml bag for ADULT or 50 ml bag for PEDS  1 g Intravenous See Admin Instructions         No current Epic-ordered outpatient prescriptions on file.     Wt Readings from Last 1 Encounters:   04/24/17 90.7 kg (200 lb)     Temp Readings from Last 1 Encounters:   04/24/17 36.7  C (98  F) (Oral)     BP Readings from Last 6 Encounters:   04/24/17 110/76   05/13/16 128/84   03/03/16 133/81   01/14/16 116/74   07/03/15 123/82   12/18/14 132/80     Pulse Readings from Last 4 Encounters:   04/24/17 72   05/13/16 68   03/03/16 60   01/14/16 72     Resp Readings from Last 1 Encounters:   04/24/17 16     SpO2 Readings from Last 1 Encounters:   04/24/17 99%     Recent Labs   Lab Test  09/04/11   0744  09/03/11   0145   NA  142  144   POTASSIUM  4.4  3.7   CHLORIDE  107  103   CO2  29  30   ANIONGAP  6.8  10.9   GLC  88  70   BUN  16  13   CR  0.90  0.90   LUIS  9.6  8.8     Recent Labs   Lab Test  03/03/16   1510  09/04/11   0744  09/03/11   0145   WBC   --   7.5  10.3   HGB  14.9  14.6  14.1   PLT   --   225  229     No results for input(s): INR in the last 11957 hours.    Invalid input(s): APTT   RECENT LABS:   ECG:   ECHO:   CXR:

## 2017-04-26 NOTE — IP AVS SNAPSHOT
MRN:[id number]                      After Visit Summary   4/26/2017    Emile Navas    MRN: [id number]           Thank you!     Thank you for choosing [facility] for your care. Our goal is always to provide you with excellent care. Hearing back from our patients is one way we can continue to improve our services. Please take a few minutes to complete the written survey that you may receive in the mail after you visit with us. Thank you!        Patient Information     Date Of Birth          1980        About your hospital stay     You were admitted on:  April 26, 2017 You last received care in the:  [facility] PACU    You were discharged on:  April 26, 2017       Who to Call     For medical emergencies, please call 911.  For non-urgent questions about your medical care, please call your primary care provider or clinic, [phone]  For questions related to your surgery, please call your surgery clinic        Attending Provider     Provider Rogelio Roberson MD Orthopedics       Primary Care Provider Office Phone # Fax #    Chris Ruano Noe Williamson [phone]       [place] [address]        After Care Instructions     Activity       Your activity upon discharge: follow Dr. Dixon d/c instruction sheet            Discharge Instructions           Supplies       List the supplies the pt needs to go home  4x4s and tape                  Further instructions from your care team       Same Day Surgery Discharge Instructions for  Sedation and General Anesthesia       It's not unusual to feel dizzy, light-headed or faint for up to 24 hours after surgery or while taking pain medication.  If you have these symptoms: sit for a few minutes before standing and have someone assist you when you get up to walk or use the bathroom.      You should rest and relax for the next 24 hours. We recommend you make arrangements  to have an adult stay with you for at least 24 hours after your discharge.  Avoid hazardous and strenuous activity.      DO NOT DRIVE any vehicle or operate mechanical equipment for 24 hours following the end of your surgery.  Even though you may feel normal, your reactions may be affected by the medication you have received.      Do not drink alcoholic beverages for 24 hours following surgery.       Slowly progress to your regular diet as you feel able. It's not unusual to feel nauseated and/or vomit after receiving anesthesia.  If you develop these symptoms, drink clear liquids (apple juice, ginger ale, broth, 7-up, etc. ) until you feel better.  If your nausea and vomiting persists for 24 hours, please notify your surgeon.        All narcotic pain medications, along with inactivity and anesthesia, can cause constipation. Drinking plenty of liquids and increasing fiber intake will help.      For any questions of a medical nature, call your surgeon.      Do not make important decisions for 24 hours.      If you had general anesthesia, you may have a sore throat for a couple of days related to the breathing tube used during surgery.  You may use Cepacol lozenges to help with this discomfort.  If it worsens or if you develop a fever, contact your surgeon.       If you feel your pain is not well managed with the pain medications prescribed by your surgeon, please contact your surgeon's office to let them know so they can address your concerns.     [facility]   Post-Operative Laminectomy/Discectomy Instructions  Dr. Rgoelio Dixon    As you are getting ready to leave the hospital following your surgery, you will have many questions regarding your follow-up and after hospital care.  Dr. Dixon will talk to you about many of these questions in the hospital but it is also important for you to have your instructions written down so that you can refer to them when you get home.  These are general guidelines  that apply to patients that had your type of surgery.      If you were not given a post-operative follow up appointment then please call Dr. Dixon's office at  [phone] for a follow-up appointment for 10-14 days after the date of your surgery.      After surgery you may notice some continued leg or back pain similar to the symptoms you had prior to surgery.  This is normal and usually is related to the amount of pressure and the length of time that the nerves were pinched.  Sometimes the pain that you felt in your leg is replaced with some degree of numbness that gradually fades over a period of days or weeks.      Certain postures and activities can cause an increase in back or leg pain and may even put you at risk for recurrence.  Avoid any twisting or bending.  Do not lift anything that weighs more than 15 pounds.  When picking things up from the floor, be sure to bend at the knees.  When moving about, it is best to try to keep your back straight and well supported.      Try to restrict your sitting to a minimum for the first 1-2 days for periods less than 20-30 min at a time as sitting places an increased load on the intervertebral discs. Gradually increase sitting, as comfort allows over the first week post-op.  When you do sit, try to use a chair that provides adequate support for your back.  When riding in a car, you may want to recline the seat to lessen the load on the intervertebral disc.      Walking is an excellent post-operative exercise.  You are encouraged to walk several times per day for at least five to ten minutes at a time.  Walking for longer periods is all right if you do not develop a pain in either your back or legs.  Let your comfort be your guide in helping you set limits.  Do not participate in any sports activities.      You may shower on the third day after your surgery, if your incision is clean and dry.  You may shower earlier if you have a plastic dressing (Tegaderm) to cover  your incision.  Do not soak in a tub until you are seen in the office for your follow-up appointment with Dr. Dixon.      The small pieces of tape over your incision are called Steri-Strips. They can be removed if they become lose; other-wise leave steri-strips on for 14 days.  Your stitches are the kind that dissolves under the skin.  They do not have to be removed.      Occasionally, non-absorbable sutures (black nylon) need to be used.  This is not covered by Steri-Strips.  These sutures should be covered with a waterproof barrier when showering (Tegaderm or foam tape), and will be removed in the office at your follow-up appointment.      If you go home the day of surgery, you should put on a fresh dressing the following day.  If you stay overnight in the hospital, the nurse will check your incision and change the dressing before you are discharged.  Change your dressing daily for 10-14 days.        If you had compression stockings (TEDS) on in the hospital you do not need to wear these after you are discharged from the hospital.      You should not drive until you are no longer taking narcotic pain medication.      After your surgery, you may begin to engage in sexual activity as comfort allows.  For the first four weeks it is recommended that you participate as a passive partner.      Before returning to work, please consult Dr. Dixon regarding work limitations or restrictions. Your ability to return to work will depend on the type of work that you do and the type of surgery you had.  Please let Dr. Dixon know if you need any written information for your employer.      Infection following disc surgery is rare.  Signs of infection include: drainage from your incision, increasing redness at the margins of your wound, fevers (greater than 101   Fahrenheit), chills, rapidly increasing back pain.  If you have any of these symptoms, please call the office and either Dr. Dixon or one of the nurses can discuss this  "with you.  Other reasons to call the office would include difficulty passing your urine or trouble controlling your bowels.      If you have any questions or concerns not covered in these instructions, please feel free to call Dr. Dixon's office at [phone].  Someone is always available to answer your questions.    While you were at the hospital today you received Toradol, an antiinflammatory medication similar to Ibuprofen.  You should not take other antiinflammatory medication, such as Ibuprofen, Motrin, Advil, Aleve, Naprosyn, etc, until 5pm.     **If you have questions or concerns about your procedure,   call Dr Dixon at [phone]**                           Pending Results     No orders found from 4/24/2017 to 4/27/2017.            Admission Information     Date & Time Provider Department Dept. Phone    4/26/2017 Rogelio Dixon MD [facility] PACU [phone]      Your Vitals Were     Blood Pressure Temperature Respirations Height Weight Pulse Oximetry    115/67 97.5  F (36.4  C) (Temporal) 14 1.727 m (5' 8\") 90.7 kg (200 lb) 99%    BMI (Body Mass Index)                   30.41 kg/m2           [employer] Information     [email or web address] gives you secure access to your electronic health record. If you see a primary care provider, you can also send messages to your care team and make appointments. If you have questions, please call your primary care clinic.  If you do not have a primary care provider, please call [phone] and they will assist you.        Care EveryWhere ID     This is your Care EveryWhere ID. This could be used by other organizations to access your [facility] medical records  [id number]           Review of your medicines      START taking        Dose / Directions    oxyCODONE 5 MG IR tablet   Commonly known as:  ROXICODONE   Used for:  S/P lumbar discectomy   Notes to Patient:  One tablet taken at 11:55 am.        Dose:  5 mg   Take 1 tablet (5 mg) by mouth every 4 hours as needed " for pain   Quantity:  40 tablet   Refills:  0         CONTINUE these medicines which have NOT CHANGED        Dose / Directions    citalopram 20 MG tablet   Commonly known as:  celeXA   Used for:  Generalized anxiety disorder        Dose:  20 mg   Take 1 tablet (20 mg) by mouth daily   Quantity:  30 tablet   Refills:  11         STOP taking     traMADol 50 MG tablet   Commonly known as:  ULTRAM           triamcinolone 0.1 % cream   Commonly known as:  KENALOG                Where to get your medicines      Some of these will need a paper prescription and others can be bought over the counter. Ask your nurse if you have questions.     Bring a paper prescription for each of these medications     oxyCODONE 5 MG IR tablet                Protect others around you: Learn how to safely use, store and throw away your medicines at www.disposemymeds.org.             Medication List: This is a list of all your medications and when to take them. Check marks below indicate your daily home schedule. Keep this list as a reference.      Medications           Morning Afternoon Evening Bedtime As Needed    citalopram 20 MG tablet   Commonly known as:  celeXA   Take 1 tablet (20 mg) by mouth daily                                oxyCODONE 5 MG IR tablet   Commonly known as:  ROXICODONE   Take 1 tablet (5 mg) by mouth every 4 hours as needed for pain   Last time this was given:  5 mg on 4/26/2017 11:55 AM   Notes to Patient:  One tablet taken at 11:55 am.

## 2017-04-26 NOTE — DISCHARGE INSTRUCTIONS
Orthopedic Discharge Instructions:   After Your Injection or Aspiration  ________________________________________    Patient Name:  Emile Navas  Today's Date:  April 26, 2017  The doctor who performed your {IR RAD ORTHO INJECTION/ASPERATION PROCEDURE:116957004} was  *** at *** (hospital) in the  {IR RAD SURGERY OR INTERVENTIONAL RADIOLOGY:[id number]} Department  Care of needle site    If you have new numbness down your leg, this may last up to 6 hours, but it should go away. You may need help with walking until your leg feels normal.     Over the next 24 to 48 hours, pain at the needle site may increase before it gets better.     For the next 48 hours, use ice packs for 15 minutes, three to four times a day for pain.    If you have a bandage, you may remove it the next morning.     No tub baths, hot tubs or swimming for 48 hours. You may shower the next day.   Activity    Do not drive until morning.     Limit your activity based on your pain level. Follow your doctor s orders for activity.     You may eat a normal diet.     If you had sedation,   - You may feel sleepy, forgetful or unsteady.   - Do not drink alcohol for 24 hours.  Medicines    If you take aspirin or platelet inhibitors, you can restart them tomorrow.     Restart all other medicines today at your regular dose, including Coumadin (warfarin).    If you are restarting Coumadin, talk to your doctor about having your INR checked.   If you had a steroid shot     The medicine should help reduce swelling and pain. This may take from 7 to 10 days.     Side effects from the shot will be mild and go away in 2 to 3 days. Common side effects may include:  -  Insomnia (trouble sleeping).  -  Heartburn.  -  Flushed face.  -  Water retention (bloating or fluid build-up).  -  Increased appetite (feeling more hungry than usual).  -  Increased blood sugar.  If you have diabetes, watch your blood sugar closely. If needed, call your doctor to help you control  your blood sugar.  Some patients will get lasting relief from a single shot. Others may require up to three shots to get results. If you have more than one steroid shot, they should be given two weeks apart.  Some patients do not have relief of symptoms.    Follow-up:  {IR RAD NO FOLLOW-UP OR FOLLOW-UP NEEDED:310652571}     Call your doctor at *** or go to the Emergency Room if you have severe pain, fever or problems with bowel or bladder control. Same Day Surgery Discharge Instructions for  Sedation and General Anesthesia       It's not unusual to feel dizzy, light-headed or faint for up to 24 hours after surgery or while taking pain medication.  If you have these symptoms: sit for a few minutes before standing and have someone assist you when you get up to walk or use the bathroom.      You should rest and relax for the next 24 hours. We recommend you make arrangements to have an adult stay with you for at least 24 hours after your discharge.  Avoid hazardous and strenuous activity.      DO NOT DRIVE any vehicle or operate mechanical equipment for 24 hours following the end of your surgery.  Even though you may feel normal, your reactions may be affected by the medication you have received.      Do not drink alcoholic beverages for 24 hours following surgery.       Slowly progress to your regular diet as you feel able. It's not unusual to feel nauseated and/or vomit after receiving anesthesia.  If you develop these symptoms, drink clear liquids (apple juice, ginger ale, broth, 7-up, etc. ) until you feel better.  If your nausea and vomiting persists for 24 hours, please notify your surgeon.        All narcotic pain medications, along with inactivity and anesthesia, can cause constipation. Drinking plenty of liquids and increasing fiber intake will help.      For any questions of a medical nature, call your surgeon.      Do not make important decisions for 24 hours.      If you had general anesthesia, you may have  a sore throat for a couple of days related to the breathing tube used during surgery.  You may use Cepacol lozenges to help with this discomfort.  If it worsens or if you develop a fever, contact your surgeon.       If you feel your pain is not well managed with the pain medications prescribed by your surgeon, please contact your surgeon's office to let them know so they can address your concerns.     [facility]   Post-Operative Laminectomy/Discectomy Instructions  Dr. Rogelio Dixon    As you are getting ready to leave the hospital following your surgery, you will have many questions regarding your follow-up and after hospital care.  Dr. Dixon will talk to you about many of these questions in the hospital but it is also important for you to have your instructions written down so that you can refer to them when you get home.  These are general guidelines that apply to patients that had your type of surgery.      If you were not given a post-operative follow up appointment then please call Dr. Dixon's office at  [phone] for a follow-up appointment for 10-14 days after the date of your surgery.      After surgery you may notice some continued leg or back pain similar to the symptoms you had prior to surgery.  This is normal and usually is related to the amount of pressure and the length of time that the nerves were pinched.  Sometimes the pain that you felt in your leg is replaced with some degree of numbness that gradually fades over a period of days or weeks.      Certain postures and activities can cause an increase in back or leg pain and may even put you at risk for recurrence.  Avoid any twisting or bending.  Do not lift anything that weighs more than 15 pounds.  When picking things up from the floor, be sure to bend at the knees.  When moving about, it is best to try to keep your back straight and well supported.      Try to restrict your sitting to a minimum for the first 1-2 days for  periods less than 20-30 min at a time as sitting places an increased load on the intervertebral discs. Gradually increase sitting, as comfort allows over the first week post-op.  When you do sit, try to use a chair that provides adequate support for your back.  When riding in a car, you may want to recline the seat to lessen the load on the intervertebral disc.      Walking is an excellent post-operative exercise.  You are encouraged to walk several times per day for at least five to ten minutes at a time.  Walking for longer periods is all right if you do not develop a pain in either your back or legs.  Let your comfort be your guide in helping you set limits.  Do not participate in any sports activities.      You may shower on the third day after your surgery, if your incision is clean and dry.  You may shower earlier if you have a plastic dressing (Tegaderm) to cover your incision.  Do not soak in a tub until you are seen in the office for your follow-up appointment with Dr. Dixon.      The small pieces of tape over your incision are called Steri-Strips. They can be removed if they become lose; other-wise leave steri-strips on for 14 days.  Your stitches are the kind that dissolves under the skin.  They do not have to be removed.      Occasionally, non-absorbable sutures (black nylon) need to be used.  This is not covered by Steri-Strips.  These sutures should be covered with a waterproof barrier when showering (Tegaderm or foam tape), and will be removed in the office at your follow-up appointment.      If you go home the day of surgery, you should put on a fresh dressing the following day.  If you stay overnight in the hospital, the nurse will check your incision and change the dressing before you are discharged.  Change your dressing daily for 10-14 days.        If you had compression stockings (TEDS) on in the hospital you do not need to wear these after you are discharged from the hospital.      You should  not drive until you are no longer taking narcotic pain medication.      After your surgery, you may begin to engage in sexual activity as comfort allows.  For the first four weeks it is recommended that you participate as a passive partner.      Before returning to work, please consult Dr. Dixon regarding work limitations or restrictions. Your ability to return to work will depend on the type of work that you do and the type of surgery you had.  Please let Dr. Dixon know if you need any written information for your employer.      Infection following disc surgery is rare.  Signs of infection include: drainage from your incision, increasing redness at the margins of your wound, fevers (greater than 101   Fahrenheit), chills, rapidly increasing back pain.  If you have any of these symptoms, please call the office and either Dr. Dixon or one of the nurses can discuss this with you.  Other reasons to call the office would include difficulty passing your urine or trouble controlling your bowels.      If you have any questions or concerns not covered in these instructions, please feel free to call Dr. Dixon's office at [phone].  Someone is always available to answer your questions.    While you were at the hospital today you received Toradol, an antiinflammatory medication similar to Ibuprofen.  You should not take other antiinflammatory medication, such as Ibuprofen, Motrin, Advil, Aleve, Naprosyn, etc, until 5pm.     **If you have questions or concerns about your procedure,   call Dr Dixon at [phone]**

## 2017-04-26 NOTE — IP AVS SNAPSHOT
[address]    [facility] [address]    Phone:  [phone]                                       After Visit Summary   4/26/2017    Emile Navas    MRN: [id number]           After Visit Summary Signature Page     I have received my discharge instructions, and my questions have been answered. I have discussed any challenges I see with this plan with the nurse or doctor.    ..........................................................................................................................................  Patient/Patient Representative Signature      ..........................................................................................................................................  Patient Representative Print Name and Relationship to Patient    ..................................................               ................................................  Date                                            Time    ..........................................................................................................................................  Reviewed by Signature/Title    ...................................................              ..............................................  Date                                                            Time

## 2017-05-16 ENCOUNTER — TRANSFERRED RECORDS (OUTPATIENT)
Dept: HEALTH INFORMATION MANAGEMENT | Facility: CLINIC | Age: 37
End: 2017-05-16

## 2017-06-27 ENCOUNTER — OFFICE VISIT (OUTPATIENT)
Dept: FAMILY MEDICINE | Facility: CLINIC | Age: 37
End: 2017-06-27
Payer: COMMERCIAL

## 2017-06-27 VITALS
DIASTOLIC BLOOD PRESSURE: 65 MMHG | SYSTOLIC BLOOD PRESSURE: 114 MMHG | TEMPERATURE: 98.4 F | RESPIRATION RATE: 18 BRPM | BODY MASS INDEX: 30.82 KG/M2 | HEART RATE: 52 BPM | WEIGHT: 202.7 LBS | OXYGEN SATURATION: 100 %

## 2017-06-27 DIAGNOSIS — R20.2 PARESTHESIA: Primary | ICD-10-CM

## 2017-06-27 PROCEDURE — 99213 OFFICE O/P EST LOW 20 MIN: CPT | Performed by: FAMILY MEDICINE

## 2017-06-27 RX ORDER — TRAMADOL HYDROCHLORIDE 50 MG/1
TABLET ORAL
COMMUNITY
Start: 2017-06-26 | End: 2017-07-10

## 2017-06-27 NOTE — PATIENT INSTRUCTIONS
I recommend checking with neurosurgery at your next appointment.    It is OK to use ibuprofen 800mg every other night as needed until then. Unclear if epidural steroid injection may be of benefit.    An alternative approach could be to consider spine strengthening program - perhaps through PT.    Depending on if they feel this is neurogenic/pressure on a nerve in nature - may or may not benefit from a neurology eval to consider medication.

## 2017-06-27 NOTE — PROGRESS NOTES
SUBJECTIVE:                                                    Emile Navas is a 36 year old male who presents to clinic today for the following health issues:    Insomnia      Duration: 5 years    Description  Frequency of insomnia:  several times a week  Time to fall asleep: 2 hours  Middle of night awakening:  nightly  Early morning awakenin-2 times a week    Accompanying signs and symptoms:  snoring, restless legs and fatigue    History  Similar episodes in past:  YES  Previous evaluation/sleep study:  no     Precipitating or alleviating factors:  New stressful situation: no   Caffeine intake after lunchtime: YES  OTC decongestants: no   Any new medications: no     Therapies tried and outcome: none      Problem list and histories reviewed & adjusted, as indicated.    Since his back surgery he has not worked. He has told him not to get back to work for 12 weeks. Pain is much better.    He is taking tramadol still. He has taken it for 10 years for his wrist. Mostly he takes ibuprofen for back pain - takes 800mg up to every other night.    He has a restless leg issue that has been developing. He feels like his leg needs to be stretched and kind of twitches. Ibuprofen does seem to help.    He has an appointment with neurosurgery appointment 2017.    He is not sure when it started.    He is still taking celexa 20mg daily. Mood has been OK.    EXAM:  /65  Pulse 52  Temp 98.4  F (36.9  C) (Oral)  Resp 18  Wt 202 lb 11.2 oz (91.9 kg)  SpO2 100%  BMI 30.82 kg/m2  Constitutional: Healthy, alert, no distress   EENT: PERRL, TM's clear bilaterally, oropharynx without erythema, no anterior cervical lymphadenopathy   Cardiovascular: RRR. No murmurs   Respiratory: Clear to auscultation   Neuro: patellar reflex intact, sensation to light touch of lower extremities intact, able to stand from seated position without use of arms without difficulty, no tenderness of lumbar spine.  Psych: Mood good,  affect appropriate, insight and judgment good.    ASSESSMENT    ICD-10-CM    1. Paresthesia R20.2       Plan:  Patient Instructions   I recommend checking with neurosurgery at your next appointment.    It is OK to use ibuprofen 800mg every other night as needed until then. Unclear if epidural steroid injection may be of benefit.    An alternative approach could be to consider spine strengthening program - perhaps through PT.    Depending on if they feel this is neurogenic/pressure on a nerve in nature - may or may not benefit from a neurology eval to consider medication.         Chris Williamson MD  Family Medicine Physician

## 2017-06-27 NOTE — NURSING NOTE
"Chief Complaint   Patient presents with     Musculoskeletal Problem     Refill Request       Initial /65  Pulse 52  Temp 98.4  F (36.9  C) (Oral)  Resp 18  Wt 202 lb 11.2 oz (91.9 kg)  SpO2 100%  BMI 30.82 kg/m2 Estimated body mass index is 30.82 kg/(m^2) as calculated from the following:    Height as of 4/26/17: 5' 8\" (1.727 m).    Weight as of this encounter: 202 lb 11.2 oz (91.9 kg).  Medication Reconciliation: complete     Rochelle Giang MA    "

## 2017-06-27 NOTE — MR AVS SNAPSHOT
After Visit Summary   6/27/2017    Emile Navas    MRN: [id number]           Patient Information     Date Of Birth          1980        Visit Information        Provider Department      6/27/2017 1:30 PM Chris Mackey MD [facility]        Care Instructions    I recommend checking with neurosurgery at your next appointment.    It is OK to use ibuprofen 800mg every other night as needed until then. Unclear if epidural steroid injection may be of benefit.    An alternative approach could be to consider spine strengthening program - perhaps through PT.    Depending on if they feel this is neurogenic/pressure on a nerve in nature - may or may not benefit from a neurology eval to consider medication.              Follow-ups after your visit        Who to contact     If you have questions or need follow up information about today's clinic visit or your schedule please contact [facility] directly at [phone].  Normal or non-critical lab and imaging results will be communicated to you by [employer], letter or phone within 4 business days after the clinic has received the results. If you do not hear from us within 7 days, please contact the clinic through [employer] or phone. If you have a critical or abnormal lab result, we will notify you by phone as soon as possible.  Submit refill requests through [employer] or call your pharmacy and they will forward the refill request to us. Please allow 3 business days for your refill to be completed.          Additional Information About Your Visit        MyChart Information     [employer] gives you secure access to your electronic health record. If you see a primary care provider, you can also send messages to your care team and make appointments. If you have questions, please call your primary care clinic.  If you do not have a primary care provider, please call [phone] and they will assist you.        Care  EveryWhere ID     This is your Care EveryWhere ID. This could be used by other organizations to access your [facility] medical records  [id number]        Your Vitals Were     Pulse Temperature Respirations Pulse Oximetry BMI (Body Mass Index)       52 98.4  F (36.9  C) (Oral) 18 100% 30.82 kg/m2        Blood Pressure from Last 3 Encounters:   06/27/17 114/65   04/26/17 121/62   04/24/17 110/76    Weight from Last 3 Encounters:   06/27/17 202 lb 11.2 oz (91.9 kg)   04/26/17 200 lb (90.7 kg)   04/24/17 200 lb (90.7 kg)              Today, you had the following     No orders found for display       Primary Care Provider Office Phone # Fax #    Chris Alden Williamson [phone]       [address]  [facility] [address]        Equal Access to Services     SUSIE FRAZIER AH: Hadii aad ku hadasho Soomaali, waaxda luqadaha, qaybta kaalmada adeegyada, waxay idiin hayaan raymond dominguez . So [facility] [phone].    ATENCIÓN: Si habla español, tiene a crockett disposición servicios gratuitos de asistencia lingüística. Llame al [phone].    We comply with applicable federal civil rights laws and [place] laws. We do not discriminate on the basis of race, color, national origin, age, disability sex, sexual orientation or gender identity.            Thank you!     Thank you for choosing [facility]  for your care. Our goal is always to provide you with excellent care. Hearing back from our patients is one way we can continue to improve our services. Please take a few minutes to complete the written survey that you may receive in the mail after your visit with us. Thank you!             Your Updated Medication List - Protect others around you: Learn how to safely use, store and throw away your medicines at www.disposemymeds.org.          This list is accurate as of: 6/27/17  2:08 PM.  Always use your most recent med list.                   Brand Name Dispense  Instructions for use Diagnosis    citalopram 20 MG tablet    celeXA    30 tablet    Take 1 tablet (20 mg) by mouth daily    Generalized anxiety disorder       traMADol 50 MG tablet    ULTRAM

## 2017-07-10 ENCOUNTER — NURSE TRIAGE (OUTPATIENT)
Dept: NURSING | Facility: CLINIC | Age: 37
End: 2017-07-10

## 2017-07-10 ENCOUNTER — MYC REFILL (OUTPATIENT)
Dept: FAMILY MEDICINE | Facility: CLINIC | Age: 37
End: 2017-07-10

## 2017-07-10 DIAGNOSIS — M51.9 LUMBAR DISC DISEASE: Primary | ICD-10-CM

## 2017-07-10 DIAGNOSIS — F41.1 GENERALIZED ANXIETY DISORDER: ICD-10-CM

## 2017-07-10 NOTE — TELEPHONE ENCOUNTER
Requesting a refill on Celexa.  He has been taking Celexa 20mg daily since January of 2016.  Patient did see a different provider earlier in the year, he thinks this may be why there is confusion regarding this medication.  Patient was just seen by pcp on 6/27/17.  Will send a message to pcp.     Amanda Vásquez RN/FNA

## 2017-07-10 NOTE — TELEPHONE ENCOUNTER
Message from [employer]:  Original authorizing provider: MD Emile Rose would like a refill of the following medications:  citalopram (CELEXA) 20 MG tablet [Chris Williamson MD]    Preferred pharmacy: [facility] [address] [place] [address] [address] AT [place] [address]    Comment:

## 2017-07-10 NOTE — TELEPHONE ENCOUNTER
Pt states he has been taking Celexa 20mg daily since January of 2016.  Patient did see a different provider earlier in the year, he thinks this may be why there is confusion regarding this medication.  Patient was just seen by pcp on 6/27/17.      Pharmacy: [facility] RN/FNA

## 2017-07-11 RX ORDER — CITALOPRAM HYDROBROMIDE 20 MG/1
20 TABLET ORAL DAILY
Qty: 90 TABLET | Refills: 3 | Status: SHIPPED | OUTPATIENT
Start: 2017-07-11 | End: 2018-07-25

## 2017-07-11 RX ORDER — TRAMADOL HYDROCHLORIDE 50 MG/1
50 TABLET ORAL EVERY 8 HOURS PRN
Qty: 28 TABLET | Refills: 0 | Status: SHIPPED | OUTPATIENT
Start: 2017-07-11 | End: 2017-08-15

## 2017-07-11 NOTE — TELEPHONE ENCOUNTER
Dr. Noe Williamson-Please review and sign if agree.  Writer responded to patient asking him to schedule anxiety follow up visit.    Thank you!  ROBERTO Bush, RN          citalopram (CELEXA) 20 MG tablet     Last Written Prescription Date: 1/14/16  Last Fill Quantity: 30, # refills: 11  Last Office Visit with [facility] primary care provider:  6/27/17 with Dr. Noe Williamson        Last PHQ-9 score on record= No flowsheet data found.

## 2017-07-11 NOTE — TELEPHONE ENCOUNTER
Pt states they received refill on Celexa, but did not receive refill on Tramadol. Please advise.    Sona Eller  Patient Representative

## 2017-07-11 NOTE — TELEPHONE ENCOUNTER
He recently had a visit with me, doesn't need a follow-up actually. Please clarify with patient that this was a miscommunication. I am happy to see him if he would like, but not necessary.    Chris Williamson MD  Family Medicine Physician

## 2017-07-11 NOTE — TELEPHONE ENCOUNTER
Routing refill request to provider for review/approval because:  Drug not on the [facility] refill protocol   Medication is reported/historical

## 2017-07-12 NOTE — TELEPHONE ENCOUNTER
Faxed rx of tramadol 50 mg to [facility] on [address] in [facility].        Riky Amor MA

## 2017-07-18 ENCOUNTER — TRANSFERRED RECORDS (OUTPATIENT)
Dept: HEALTH INFORMATION MANAGEMENT | Facility: CLINIC | Age: 37
End: 2017-07-18

## 2017-08-15 ENCOUNTER — MYC REFILL (OUTPATIENT)
Dept: FAMILY MEDICINE | Facility: CLINIC | Age: 37
End: 2017-08-15

## 2017-08-15 DIAGNOSIS — M51.9 LUMBAR DISC DISEASE: ICD-10-CM

## 2017-08-15 RX ORDER — TRAMADOL HYDROCHLORIDE 50 MG/1
50 TABLET ORAL EVERY 8 HOURS PRN
Qty: 28 TABLET | Refills: 0 | Status: SHIPPED | OUTPATIENT
Start: 2017-08-15 | End: 2017-09-19

## 2017-08-15 NOTE — TELEPHONE ENCOUNTER
I faxed Rx for Tramadol (Ultram) 50 MG tablet [facility] [facility] [place] [place]  Start Date: 08/15/2017  Fax #: [phone]      Chanelle Li MA

## 2017-08-15 NOTE — TELEPHONE ENCOUNTER
Message from [employer]:  Original authorizing provider: MD Emile Rose would like a refill of the following medications:  traMADol (ULTRAM) 50 MG tablet [Chris Williamson MD]    Preferred pharmacy: [facility] [address] [place] [address] [address] AT [place] [address]    Comment:

## 2017-09-19 ENCOUNTER — MYC REFILL (OUTPATIENT)
Dept: FAMILY MEDICINE | Facility: CLINIC | Age: 37
End: 2017-09-19

## 2017-09-19 DIAGNOSIS — M51.9 LUMBAR DISC DISEASE: ICD-10-CM

## 2017-09-19 NOTE — TELEPHONE ENCOUNTER
RUSTYW review for TRAMADOL REFILL REQUEST.  Last filled 8/15/17 # 28  Last OV 6/27/17.    Routing refill request to provider for review/approval because:  Drug not on the [facility] refill protocol   Rianna Perdue RN

## 2017-09-19 NOTE — TELEPHONE ENCOUNTER
Message from Anonymesst:  Original authorizing provider: MD Emile Rose would like a refill of the following medications:  traMADol (ULTRAM) 50 MG tablet [Chris Williamson MD]    Preferred pharmacy: [facility] [address] [place] [address] [address] AT [place] [address]    Comment:

## 2017-09-21 NOTE — TELEPHONE ENCOUNTER
Reason for Call:  Medication or medication refill:    Do you use a [facility]?  Name of the pharmacy and phone number for the current request:  [facility] [place] [facility]    Name of the medication requested: Tramadol    Other request: Pt called in to check on status. He states he is currently all out. Pt is expecting to go mountain biking this weekend and was hoping he can get his refill by then so he won't have any wrist pain. Please assist ASAP thanks!    Can we leave a detailed message on this number? YES    Phone number patient can be reached at: [phone]    Best Time: anytime    Call taken on 9/21/2017 at 12:43 PM by Dior Flores

## 2017-09-22 RX ORDER — TRAMADOL HYDROCHLORIDE 50 MG/1
50 TABLET ORAL EVERY 8 HOURS PRN
Qty: 28 TABLET | Refills: 0 | Status: SHIPPED | OUTPATIENT
Start: 2017-09-22 | End: 2017-10-17

## 2017-10-04 ENCOUNTER — MYC MEDICAL ADVICE (OUTPATIENT)
Dept: FAMILY MEDICINE | Facility: CLINIC | Age: 37
End: 2017-10-04

## 2017-10-04 DIAGNOSIS — R06.83 SNORING: Primary | ICD-10-CM

## 2017-10-04 NOTE — TELEPHONE ENCOUNTER
Options are to meet with me or have sleep referral. Either approach is OK. I will sign referral if he prefers to go that route first.    Chris Williamson MD  Family Medicine Physician

## 2017-10-17 ENCOUNTER — MYC REFILL (OUTPATIENT)
Dept: FAMILY MEDICINE | Facility: CLINIC | Age: 37
End: 2017-10-17

## 2017-10-17 DIAGNOSIS — M51.9 LUMBAR DISC DISEASE: ICD-10-CM

## 2017-10-18 RX ORDER — TRAMADOL HYDROCHLORIDE 50 MG/1
50 TABLET ORAL EVERY 8 HOURS PRN
Qty: 28 TABLET | Refills: 0 | Status: SHIPPED | OUTPATIENT
Start: 2017-10-18 | End: 2017-11-16

## 2017-10-18 NOTE — TELEPHONE ENCOUNTER
Routing refill request to provider for review/approval because:  Drug not on the [facility] refill protocol, last filled 9/22/2017

## 2017-10-18 NOTE — TELEPHONE ENCOUNTER
Message from [employer]:  Original authorizing provider: MD Emile Rose would like a refill of the following medications:  traMADol (ULTRAM) 50 MG tablet [Chris Williamsno MD]    Preferred pharmacy: [facility] [address] [place] [address] [address] AT [place] [address]    Comment:

## 2017-10-27 ENCOUNTER — OFFICE VISIT (OUTPATIENT)
Dept: SLEEP MEDICINE | Facility: CLINIC | Age: 37
End: 2017-10-27
Attending: FAMILY MEDICINE
Payer: COMMERCIAL

## 2017-10-27 VITALS
DIASTOLIC BLOOD PRESSURE: 80 MMHG | WEIGHT: 204 LBS | HEIGHT: 68 IN | OXYGEN SATURATION: 96 % | BODY MASS INDEX: 30.92 KG/M2 | HEART RATE: 59 BPM | SYSTOLIC BLOOD PRESSURE: 122 MMHG

## 2017-10-27 DIAGNOSIS — G25.81 RESTLESS LEGS SYNDROME (RLS): ICD-10-CM

## 2017-10-27 DIAGNOSIS — R06.83 SNORING: Primary | ICD-10-CM

## 2017-10-27 PROCEDURE — 99211 OFF/OP EST MAY X REQ PHY/QHP: CPT | Mod: ZF

## 2017-10-27 NOTE — MR AVS SNAPSHOT
After Visit Summary   10/27/2017    Emile Navas    MRN: [id number]           Patient Information     Date Of Birth          1980        Visit Information        Provider Department      10/27/2017 10:30 AM Rizwan Umanzor MD [facility], [place], Sleep Study        Today's Diagnoses     Snoring    -  1    Restless legs syndrome (RLS)          Care Instructions      Your BMI is Body mass index is 31.02 kg/(m^2).  Weight management is a personal decision.  If you are interested in exploring weight loss strategies, the following discussion covers the approaches that may be successful. Body mass index (BMI) is one way to tell whether you are at a healthy weight, overweight, or obese. It measures your weight in relation to your height.  A BMI of 18.5 to 24.9 is in the healthy range. A person with a BMI of 25 to 29.9 is considered overweight, and someone with a BMI of 30 or greater is considered obese. More than two-thirds of American adults are considered overweight or obese.  Being overweight or obese increases the risk for further weight gain. Excess weight may lead to heart disease and diabetes.  Creating and following plans for healthy eating and physical activity may help you improve your health.  Weight control is part of healthy lifestyle and includes exercise, emotional health, and healthy eating habits. Careful eating habits lifelong are the mainstay of weight control. Though there are significant health benefits from weight loss, long-term weight loss with diet alone may be very difficult to achieve- studies show long-term success with dietary management in less than 10% of people. Attaining a healthy weight may be especially difficult to achieve in those with severe obesity. In some cases, medications, devices and surgical management might be considered.  What can you do?  If you are overweight or obese and are interested in methods for weight loss, you should discuss this with your  "provider.     Consider reducing daily calorie intake by 500 calories.     Keep a food journal.     Avoiding skipping meals, consider cutting portions instead.    Diet combined with exercise helps maintain muscle while optimizing fat loss. Strength training is particularly important for building and maintaining muscle mass. Exercise helps reduce stress, increase energy, and improves fitness. Increasing exercise without diet control, however, may not burn enough calories to loose weight.       Start walking three days a week 10-20 minutes at a time    Work towards walking thirty minutes five days a week     Eventually, increase the speed of your walking for 1-2 minutes at time    In addition, we recommend that you review healthy lifestyles and methods for weight loss available through the National Institutes of Health patient information sites:  http://win.niddk.nih.gov/publications/index.htm    And look into health and wellness programs that may be available through your health insurance provider, employer, local community center, or otto club.    Weight management plan: Patient was referred to their PCP to discuss a diet and exercise plan.    MY INFORMATION ON SLEEP APNEA-  Emile Navas    DOCTOR : Rizwan Umanzor  SLEEP CENTER :      MY CONTACT NUMBER:   [facility]  [phone]  [facility]   [phone]  [facility]   [phone]      [facility]  [phone]  [facility]   [phone]      [place]:  1. What is Obstructive Sleep Apnea (ARIELLA)? ARIELLA is the most common type of sleep apnea. Apnea literally means, \"without breath.\" It is characterized by repetitive pauses in breathing, despite continued effort to breathe, and is usually associated with a reduction in blood oxygen saturation. Apneas can last 10 to over 60 seconds. It is caused by narrowing or collapse of the upper airway as muscles " relax during sleep.   2. What are the consequences of ARIELLA? Symptoms include: daytime sleepiness- possibly increasing the risk of falling asleep while driving, unrefreshing/restless sleep, snoring, insomnia, waking frequently to urinate, waking with heartburn or reflux, reduced concentration and memory, and morning headaches. Other health consequences may include development of high blood pressure. Untreated ARIELLA also can contribute to heart disease, stroke and diabetes.   3. What are the treatment options? In most situations, sleep apnea is a lifelong disease that must be managed with daily therapy. Continuous Positive Airway (CPAP) is the most reliable treatment. A mouthguard to hold your jaw forward is usually the next most reliable option. Other options include postioning devices (to keep you off your back), nasal valves, tongue retaining device, weight loss, surgery. There is more detail about these options toward the end of this document.  4. What are the most important things to remember about using CPAP?     WHERE CAN I FIND MORE INFORMATION?    American Academy of Sleep Medicine Patient information on sleep disorders:  http://yoursleep.aasmnet.org    CPAP -  WHY AND HOW?                 Continuous positive airway pressure, or CPAP, is the most effective treatment for obstructive sleep apnea. It works by blowing room air, through a mask, to hold your throat open. A decision to use CPAP is a major step forward in the pursuit of a healthier life. The successful use of CPAP will help you breathe easier, sleep better and live healthier. Using CPAP can be a positive experience if you keep these eldridge points in mind:  1. Commitment  CPAP is not a quick fix for your problem. It involves a long-term commitment to improve your sleep and your health.    2. Communication  Stay in close communication with both your sleep doctor and your CPAP supplier. Ask lots of questions and seek help when you need  "it.    3. Consistency  Use CPAP all night, every night and for every nap. You will receive the maximum health benefits from CPAP when you use it every time that you sleep. This will also make it easier for your body to adjust to the treatment.    4. Correction  The first machine and mask that you try may not be the best ones for you. Work with your sleep doctor and your CPAP supplier to make corrections to your equipment selection. Ask about trying a different type of machine or mask if you have ongoing problems. Make sure that your mask is a good fit and learn to use your equipment properly.    5. Challenge  Tell a family member or close friend to ask you each morning if you used your CPAP the previous night. Have someone to challenge you to give it your best effort.    6. Connection   Your adjustment to CPAP will be easier if you are able to connect with others who use the same treatment. Ask your sleep doctor if there is a support group in your area for people who have sleep apnea, or look for one on the Internet.  7. Comfort   Increase your level of comfort by using a saline spray, decongestant or heated humidifier if CPAP irritates your nose, mouth or throat. Use your unit's \"ramp\" setting to slowly get used to the air pressure level. There may be soft pads you can buy that will fit over your mask straps. Look on www.CPAP.com for accessories that can help make CPAP use more comfortable.  8. Cleaning   Clean your mask, tubing and headgear on a regular basis. Put this time in your schedule so that you don't forget to do it. Check and replace the filters for your CPAP unit and humidifier.    9. Completion   Although you are never finished with CPAP therapy, you should reward yourself by celebrating the completion of your first month of treatment. Expect this first month to be your hardest period of adjustment. It will involve some trial and error as you find the machine, mask and pressure settings that are right " for you.    10. Continuation  After your first month of treatment, continue to make a daily commitment to use your CPAP all night, every night and for every nap.    CPAP-Tips to starting with success:  Begin using your CPAP for short periods of time during the day while you watch TV or read.    Use CPAP every night and for every nap. Using it less often reduces the health benefits and makes it harder for your body to get used to it.    Newer CPAP models are virtually silent; however, if you find the sound of your CPAP machine to be bothersome, place the unit under your bed to dampen the sound.     Make small adjustments to your mask, tubing, straps and headgear until you get the right fit. Tightening the mask may actually worsen the leak.  If it leaves significant marks on your face or irritates the bridge of your nose, it may not be the best mask for you.  Speak with the person who supplied the mask and consider trying other masks. Insurances will allow you to try different masks during the first month of starting CPAP.  Insurance also covers a new mask, hose and filter about every 6 months.    Use a saline nasal spray to ease mild nasal congestion. Neti-Pot or saline nasal rinses may also help. Nasal gel sprays can help reduce nasal dryness.  Biotene mouthwash can be helpful to protect your teeth if you experience frequent dry mouth.  Dry mouth may be a sign of air escaping out of your mouth or out of the mask in the case of a full face mask.  Speak with your provider if you expect that is the case.     Take a nasal decongestant to relieve more severe nasal or sinus congestion.  Do not use Afrin (oxymetazoline) nasal spray more than 3 days in a row.  Speak with your sleep doctor if your nasal congestion is chronic.    Use a heated humidifier that fits your CPAP model to enhance your breathing comfort. Adjust the heat setting up if you get a dry nose or throat, down if you get condensation in the hose or mask.   Position the CPAP lower than you so that any condensation in the hose drains back into the machine rather than towards the mask.    Try a system that uses nasal pillows if traditional masks give you problems.    Clean your mask, tubing and headgear once a week. Make sure the equipment dries fully.    Regularly check and replace the filters for your CPAP unit and humidifier.    Work closely with your sleep provider and your CPAP supplier to make sure that you have the machine, mask and air pressure setting that works best for you. It is better to stop using it and call your provider to solve problems than to lay awake all night frustrated with the device.    BESIDES CPAP, WHAT OTHER THERAPIES ARE THERE?    Postioning devices if you only have the snoring or apnea while on your back    Dental devices if your condition is mild    Nasal valves may be effective though experience is limited    Weight loss if you are overweight    Surgery in limited cases where devices are not acceptable or there are problems with structures in the nose and throat  If treated with one of these alternative options, further evaluation is necessary to ensure that the therapy is effective. This may require some form of repeat testing.      Healthy Lifestyle:  Healthy diet, exercise and limit alcohol: Not only will excessive alcohol increase your weight over time, but it irritates the throat tissues and make them swell, shrinking the airway and causing snoring. Drinking alcohol should be limited and stopped within 3-4 hours before going to bed.   Stop smoking: (Red swollen throat, heat, nicotine), also irritates and swells the airway, among numerous other negative health consequences.    Positioning Device  This example shows a pillow that straps around the waist. It may be appropriate for those whose sleep study shows milder sleep apnea that occurs primarily when lying flat on one's back. Preliminary studies have shown benefit but effectiveness  at home should be verified.                      Oral Appliance  These are examples of two of many custom-made devices that are more likely to work in mild sleep apnea   Oral appliances are dental mouth pieces that fit very much like a sports mouth guards or removable orthodontic retainers. They are used to treat snoring and obstructive sleep apnea . The device prevents the airway from collapsing by either supporting the jaw in a forward position. Since oral appliances are non-invasive and easy to use, they may be considered as an early treatment option. Oral appliance therapy (OAT) involves the customization, selection, fabrication, fitting, adjustments and long-term follow-up care.  Custom made oral appliances are proven to be more effective than over-the-counter devices. Therefore, the over-the-counter devices are recommended not to be used as a screening tool nor as a therapeutic option.     Who gets a dental device?  Oral appliance therapy can be used as an alternative to CPAP therapy for the treatment of mild to moderate sleep apnea and for those patients who prefer OAT to CPAP. Oral appliance therapy is a first line therapy for the treatment of primary snoring. Additionally, OAT is an option for those that cannot tolerate CPAP as therapy or who have experienced insufficient surgical results.                 Possible side effects?  Frequent but minor side effects include: excessive salivation, dry mouth, discomfort of teeth and jaw and temporary changes in the patient s bite.  Potential complications include: jaw pain, permanent occlusal changes and TMJ symptoms.  The above mentioned side effects and complications can be recognized and managed by dentists trained in dental sleep medicine.   Finding a dentist that practices dental sleep medicine  Specific training is available through the American Academy of Dental Sleep Medicine for dentists interested in working in the field of sleep. To find a dentist who is  educated in the field of sleep and the use of oral appliances, near you, visit the Web site of the American Academy of Dental Sleep Medicine; also see http://www.accpstorage.org/newOrganization/patients/oralAppliances.pdf   To search for a dentist certified in these practices:  Http://aadsm.org/FindADentist.aspx?1  Nasal Valves              Nasal valves may be an option for mild apnea if other options are not well tolerated. The efficacy of these devices is generally less than CPAP or oral appliances.They may not be effective if you have frequent nasal congestion or have difficulty breathing through your nose.   Weight Loss:    Weight loss decreases severity of sleep apnea in most people with obesity. For those with mild obesity who have developed snoring with weight gain, even 15-30 pound weight loss can improve and occasionally eliminate sleep apnea.  Structured and life-long dietary and health habits are necessary to lose weight and keep healthier weight levels.     Though there are significant health benefits from weight loss, long-term weight loss is very difficult to achieve- studies show success with dietary management in less than 10% of people. In addition, substantial weight loss may require years of dietary control and may be difficult if patients have severe obesity. In these cases, surgical management may be considered.    If you are interested in methods for weight loss, you should review the options discussed at the National Institutes of Health patient information sites:     http://win.niddk.nih.gov/publications/index.htm  http:/www.health.nih.gov/topic/WeightLossDieting    Bariatric programs offer counseling in all methods of weight loss:    Http:/www.[facility].org/Specialties/WeightLossSurgeryandMedicalMgmt/htm    Your BMI is Body mass index is 31.02 kg/(m^2).    Weight management plan: Patient was referred to their PCP to discuss a diet and exercise plan.    Body mass index (BMI) is one  way to tell whether you are at a healthy weight, overweight, or obese. It measures your weight in relation to your height.  A BMI of 18.5 to 24.9 is in the healthy range. A person with a BMI of 25 to 29.9 is considered overweight, and someone with a BMI of 30 or greater is considered obese.  Another way to find out if you are at risk for health problems caused by overweight and obesity is to measure your waist. If you are a woman and your waist is more than 35 inches, or if you are a man and your waist is more than 40 inches, your risk of disease may be higher.  More than two-thirds of American adults are considered overweight or obese. Being overweight or obese increases the risk for further weight gain.  Excess weight may lead to heart disease and diabetes. Creating and following plans for healthy eating and physical activity may help you improve your health.    Methods for maintaining or losing weight.    Weight control is part of healthy lifestyle and includes exercise, emotional health, and healthy eating habits.  Careful eating habits lifelong is the mainstay of weight control.  Though there are significant health benefits from weight loss, long-term weight loss with diet alone may be very difficult to achieve- studies show long-term success with dietary management in less than 10% of people. Attaining a healthy weight may be especially difficult to achieve in those with severe obesity. In some cases, medications, devices and surgical management might be considered.    What can you do?    If you are overweight or obese and are interested in methods for weight loss, you should discuss this with your provider. In addition, we recommend that you review healthy life styles and methods for weight loss available through the National Institutes of Health patient information sites:     http://win.niddk.nih.gov/publications/index.htm      Surgery:  There are a number of surgeries that have been attempted to treat  "apnea. In general, surgical options are usually reserved for cases in which there is a physical abnormality contributing to obstruction or other treatment options are ineffective or not tolerated. Most surgical options are either unreliable or quite invasive. One of the more common procedures is:  Uvulopalatopharyngoplasty: In this procedure, the uvula (the finger-like tissue that hangs in the back of the throat), part of the soft palate (the tissue that the uvula is attached to), and sometimes the tonsils or adenoids are removed. The efficacy of this surgery is around 30-50% .  After surgery, complications may include:  Sleepiness and sleep apnea related to post-surgery medication   Swelling, infection and bleeding   A sore throat and/or difficulty swallowing   Drainage of secretions into the nose and a nasal quality to the voice. English language speech does not seem to be affected by this surgery.   Narrowing of the airway in the nose and throat (hence constricting breathing) snoring and even iatrogenically caused sleep apnea. By cutting the tissues, excess scar tissue can \"tighten\" the airway and make it even smaller than it was before UPPP.  Patients who have had the uvula removed will become unable to correctly speak Hebrew or any other language that has a uvular 'r' phoneme.    Surgeries to help resolve nasal congestion may help reduce the severity of apnea slightly. Nasal congestion does not cause apnea on its own, so these surgeries are usually not performed just for ARIELLA.  They may be worth considering if the nasal congestion is significantly bothersome independent of apnea.                Follow-ups after your visit        Follow-up notes from your care team     Return in about 2 years (around 10/27/2019) for To discuss sleep study results.      Your next 10 appointments already scheduled     Nov 09, 2017 11:00 AM SUSAN VARGAS  with SLEEP STUDY RM 7   [facility], Sleep Study ([facility]" [facility])    [address] [place] [id number]   [phone]            Nov 10, 2017 10:00 AM CST   HST Drop Off with DME SCHEDULE   [facility]    [address] [place] [id number]   [phone]            Nov 21, 2017 10:00 AM CST   Return Sleep Patient with Rizwan Umanzor MD   [facility])    [address] [place] [id number] [phone]              Future tests that were ordered for you today     Open Future Orders        Priority Expected Expires Ordered    HST-Home Sleep Apnea Test Routine  4/28/2018 10/27/2017    Ferritin Routine  12/26/2017 10/27/2017            Who to contact     If you have questions or need follow up information about today's clinic visit or your schedule please contact [facility] directly at [phone].  Normal or non-critical lab and imaging results will be communicated to you by [employer], letter or phone within 4 business days after the clinic has received the results. If you do not hear from us within 7 days, please contact the clinic through edot or phone. If you have a critical or abnormal lab result, we will notify you by phone as soon as possible.  Submit refill requests through [employer] or call your pharmacy and they will forward the refill request to us. Please allow 3 business days for your refill to be completed.          Additional Information About Your Visit        [employer] Information     [employer] gives you secure access to your electronic health record. If you see a primary care provider, you can also send messages to your care team and make appointments. If you have questions, please call your primary care clinic.  If you do not have a primary care provider, please call [phone] and they will assist you.        Care EveryWhere ID      "This is your Care EveryWhere ID. This could be used by other organizations to access your [place] medical records  [id number]        Your Vitals Were     Pulse Height Pulse Oximetry BMI (Body Mass Index)          59 1.727 m (5' 8\") 96% 31.02 kg/m2         Blood Pressure from Last 3 Encounters:   10/27/17 122/80   06/27/17 114/65   04/26/17 121/62    Weight from Last 3 Encounters:   10/27/17 92.5 kg (204 lb)   06/27/17 91.9 kg (202 lb 11.2 oz)   04/26/17 90.7 kg (200 lb)              We Performed the Following     SLEEP EVALUATION & MANAGEMENT REFERRAL - ADULT        Primary Care Provider Office Phone # Fax #    Chris Alden Williamson [phone] [address]  [facility] [id number]        Equal Access to Services     [facility]: Hadii aad ku hadasho Soomaali, waaxda luqadaha, qaybta kaalmada adeegyada, waxay idiin hayaan adeneetu dominguez . So [facility] [phone].    ATENCIÓN: Si habla español, tiene a crockett disposición servicios gratuitos de asistencia lingüística. Micaela al [phone].    We comply with applicable federal civil rights laws and [place] laws. We do not discriminate on the basis of race, color, national origin, age, disability, sex, sexual orientation, or gender identity.            Thank you!     Thank you for choosing [facility], SLEEP STUDY  for your care. Our goal is always to provide you with excellent care. Hearing back from our patients is one way we can continue to improve our services. Please take a few minutes to complete the written survey that you may receive in the mail after your visit with us. Thank you!             Your Updated Medication List - Protect others around you: Learn how to safely use, store and throw away your medicines at www.disposemymeds.org.          This list is accurate as of: 10/27/17  4:34 PM.  Always use your most recent med list.                   Brand Name Dispense Instructions for use Diagnosis    citalopram 20 MG tablet "    celeXA    90 tablet    Take 1 tablet (20 mg) by mouth daily    Generalized anxiety disorder       traMADol 50 MG tablet    ULTRAM    28 tablet    Take 1 tablet (50 mg) by mouth every 8 hours as needed for moderate pain    Lumbar disc disease

## 2017-10-27 NOTE — PROGRESS NOTES
"Sleep Consultation Note:    Date on this visit: 10/27/2017    Emile Navas is sent by Chris KAUFFMAN for a sleep consultation regarding obstructive sleep apnea.    Primary Physician: Chris Mackey     CC:  \"I stop breathing when I sleep.\"    Emile Navas is a 37 year old with PMH snoring and backpain who reported that he thinks he may have sleep apnea.    Sleep Disordered Breathing  Emile does report snoring, snort arousals,  witnessed apneas, dry mouth, occasional morning headaches, non-refreshing sleep, daytime sleepiness/fatigue.  Emile has gained 40 lbs over the last 6 years.  Mother/father have jordon.    Sleep Schedule/Sleep Complaints  He does not report usual difficulty with falling asleep. Emile goes to bed at (12am to 5AM) 1AM, and it usually takes 1 minutes to fall asleep.    Emile does report restlessness feelings in the legs.  Symptoms are worse at night when trying to sleep.  Symptoms can be described as \"it feels like I have to move them.\"  Symptoms start when sitting still   Symptoms are relieved by moving them.  He has tried  ibuprofen in the past for RLS.  He does complain of spontaneous leg movements/jerks in the middle of the night.  Mom has RLS    Patient does report restlessness which affect the onset of sleep.    Patient does not use electronics in bed -   Patient does have a regular bed partner.  Patient sleeps in all positions.  Patient does have any pets in the bedroom at night during sleep.  He does use a sleep aid melatonin 5mg.  Benadryl 25 mg once per month. .    He does/not complain of difficulty with staying asleep.  He wakes up 5 times throughout the night.  Night time awakenings occurs due to spontaneous or need to urnate.    After awakening, He is able to fall back asleep after 2 minutes.    He wakes up at 9 AM, with / out an alarm.    On non-work days, He falls asleep at 1030-11AM and gets up 9AM.  Patient is night person.  He would naturally to go " to sleep at 2AM and get up at 11AM    Sleep Behaviors  He denies any cataplexy, sleep paralysis, sleep hallucinations.    He denies any night time behaviors - sleep walking, sleep talking, sleep eating.    He does not report history of acting out dreams.  Patient denies any injury to oneself or others while sleeping.    Daytime Functioning  Emile naps 3 days/week for 2 hours and feels refreshed after them.    He does doze off during the day -     He denies driving drowsy.  Patient's [place] Sleepiness score 12/24       Social History  Emile currently works as a paramedic.  Work schedule is as follows:  Rotates but is Sunday,tuesday, Wednesday plus one other day/week.  He does do shift work currently.  He does drink caffeine, about 2-3 drinks/day. Last caffeine intake is usually not within 6 hours of bed time.  He drinks alcohol, 4-5 drinks/year.  Does not use alcohol as a sleep aid.  Patient is a never smoker.   Denies any secondhand exposure.  Patient does not use drugs.  No future surgeries planned.    Allergies:    No Known Allergies    Medications:    Current Outpatient Prescriptions   Medication Sig Dispense Refill     traMADol (ULTRAM) 50 MG tablet Take 1 tablet (50 mg) by mouth every 8 hours as needed for moderate pain 28 tablet 0     citalopram (CELEXA) 20 MG tablet Take 1 tablet (20 mg) by mouth daily 90 tablet 3     He takes about about 28 tramadol pills every two months.    Problem List:  Patient Active Problem List    Diagnosis Date Noted     Lumbar disc disease 02/04/2016     Priority: Medium     Generalized anxiety disorder 12/18/2014     Priority: Medium     Diagnosis updated by automated process. Provider to review and confirm.          Past Medical/Surgical History:  No past medical history on file.  Past Surgical History:   Procedure Laterality Date     BACK SURGERY       LAMINECTOMY LUMBAR ONE LEVEL Right 4/26/2017    Procedure: LAMINECTOMY LUMBAR ONE LEVEL;  REVISION RIGHT L5-S1 DISCECTOMY ;   Surgeon: Rogelio Dixon MD;  Location:  OR       Social History:  Social History     Social History     Marital status:      Spouse name: N/A     Number of children: N/A     Years of education: N/A     Occupational History     Not on file.     Social History Main Topics     Smoking status: Never Smoker     Smokeless tobacco: Not on file     Alcohol use Yes      Comment: Rarely     Drug use: No     Sexual activity: Yes     Partners: Female     Birth control/ protection: None     Other Topics Concern     Parent/Sibling W/ Cabg, Mi Or Angioplasty Before 65f 55m? No     Social History Narrative       Family History:  No family history on file.    Review of Systems:    CONSTITUTIONAL: NEGATIVE for weight gain/loss, fever, chills, sweats or night sweats, drug allergies.  EYES: NEGATIVE for changes in vision, blind spots, double vision.  ENT: NEGATIVE for ear pain, sore throat, sinus pain, post-nasal drip, runny nose, bloody nose  CARDIAC: NEGATIVE for fast heartbeats or fluttering in chest, chest pain or pressure, breathlessness when lying flat, swollen legs or swollen feet.  NEUROLOGIC: NEGATIVE headaches, weakness or numbness in the arms or legs.  DERMATOLOGIC: NEGATIVE for rashes, new moles or change in mole(s)  PULMONARY: productive cough the last few weeks NEGATIVE SOB at rest, SOB with activity, dry cough, productive cough, coughing up blood, wheezing or whistling when breathing.    GASTROINTESTINAL: NEGATIVE for nausea or vomitting, loose or watery stools, fat or grease in stools, constipation, abdominal pain, bowel movements black in color or blood noted.  GENITOURINARY: Thinks he urinates more than most people NEGATIVE for pain during urination, blood in urine, urinating more frequently than usual  MUSCULOSKELETAL: NEGATIVE for muscle pain, bone or joint pain, swollen joints.  ENDOCRINE: NEGATIVE for increased thirst or urination, diabetes.  LYMPHATIC: NEGATIVE for swollen lymph nodes, lumps or bumps  "in the breasts or nipple discharge.  PSYCHE: NEGATIVE for depression, anxiety    Physical Examination:  Vitals: /80  Pulse 59  Ht 1.727 m (5' 8\")  Wt 92.5 kg (204 lb)  SpO2 96%  BMI 31.02 kg/m2  BMI= Body mass index is 31.02 kg/(m^2).    Neck Cir (cm): 42 cm    [place] Total Score 10/27/2017   Total score - [place] 12     General: No apparent distress, appropriately groomed  Head: Normocephalic, atraumatic  Eyes: no icterus, PERRL  Nose: Nares patent. No exudate/erythema. No septal deviation noted.  Orophraynx: Opening is narrowed   Mallampati Class: IV   Tonsillar Stage: 0  Neck: Supple, Circumference: 16 inches  Cardiac: Regular rate and rhythm, no murmurs  Chest: Symmetric air movement, lungs clear to auscultation bilaterally  GI: +bowel sounds  Musculoskeletal: noedema noted  Skin: Warm, dry, intact  Psych:Pleasant, mood/affect is euthymic  Mental status: Awake, alert, attentive, oriented.    Impression/Plan:    Snoring  Observed sleep apnea  Possible Obstructive sleep apnea  Restless leg syndrome      Mr. Navas  is being evaluated for ARIELLA.  STOP BANG score is 5. Patient likely has sleep apnea based on clinical history of snoring, snort arousals,  witnessed apneas, dry mouth, occasional morning headaches, non-refreshing sleep, daytime sleepiness/fatigue.  Physical exam significant for crowded oropharynx and enlarged neck.  Recommend HST as patient is high risk for ARIELLA without any comorbid conditions.    Diagnosis and treatment for ARIELLA have been discussed. Complications of untreated ARIELLA have also been discussed.    For restless leg syndrome, will check ferritin level.  Recommend iron supplementation if less than 75ng/mL. Encouraged to try non-pharmacological treatments as well - hot bath/shower, stretching, heating pads, avoiding caffeine/alcohol/chocolate prior to bed.  Pharmacological treatment offered, but Mr. Navas indicated he wished to pursued evaluation and possible treatment of ARIELLA " first.  Will consider gabapentin if RLS symptoms continue.    Patient was strongly advised to avoid driving, operating any heavy machinery or other hazardous situations while drowsy or sleepy.  Patient was counseled on the importance of driving while alert, to pull over if drowsy, or nap before getting into the vehicle if sleepy.      He will follow up with me approximately two weeks after his sleep study has been competed to review the results and discuss plan of care.    CC: Chris Liu W*      Seen and examined with Dr. Jorge Umanzor MD  Clinical Sleep Medicine Fellow  Pager #[phone]      As the attending physician I was present with Dr Umanzor during this clinic visit. I personally reviewed the key aspects of the history and physical exam, reviewed the pathophysiology, diagnosis and treatment options with the patient and I agree with the above documentation. This note reflects our mutual assessment and plan.     Jean Patel MD   of Medicine  Pulmonary, Critical Care and Sleep Medicine  [facility]  Pager: [phone]

## 2017-10-27 NOTE — NURSING NOTE
"Chief Complaint   Patient presents with     Consult     Discuss possible sleep apnea, snoring and never feeling rested       Initial /80  Pulse 59  Ht 1.727 m (5' 8\")  Wt 92.5 kg (204 lb)  SpO2 96%  BMI 31.02 kg/m2 Estimated body mass index is 31.02 kg/(m^2) as calculated from the following:    Height as of this encounter: 1.727 m (5' 8\").    Weight as of this encounter: 92.5 kg (204 lb).  Medication Reconciliation: complete     ANIYA Kauffman        "

## 2017-10-27 NOTE — PATIENT INSTRUCTIONS
Your BMI is Body mass index is 31.02 kg/(m^2).  Weight management is a personal decision.  If you are interested in exploring weight loss strategies, the following discussion covers the approaches that may be successful. Body mass index (BMI) is one way to tell whether you are at a healthy weight, overweight, or obese. It measures your weight in relation to your height.  A BMI of 18.5 to 24.9 is in the healthy range. A person with a BMI of 25 to 29.9 is considered overweight, and someone with a BMI of 30 or greater is considered obese. More than two-thirds of American adults are considered overweight or obese.  Being overweight or obese increases the risk for further weight gain. Excess weight may lead to heart disease and diabetes.  Creating and following plans for healthy eating and physical activity may help you improve your health.  Weight control is part of healthy lifestyle and includes exercise, emotional health, and healthy eating habits. Careful eating habits lifelong are the mainstay of weight control. Though there are significant health benefits from weight loss, long-term weight loss with diet alone may be very difficult to achieve- studies show long-term success with dietary management in less than 10% of people. Attaining a healthy weight may be especially difficult to achieve in those with severe obesity. In some cases, medications, devices and surgical management might be considered.  What can you do?  If you are overweight or obese and are interested in methods for weight loss, you should discuss this with your provider.     Consider reducing daily calorie intake by 500 calories.     Keep a food journal.     Avoiding skipping meals, consider cutting portions instead.    Diet combined with exercise helps maintain muscle while optimizing fat loss. Strength training is particularly important for building and maintaining muscle mass. Exercise helps reduce stress, increase energy, and improves fitness.  "Increasing exercise without diet control, however, may not burn enough calories to loose weight.       Start walking three days a week 10-20 minutes at a time    Work towards walking thirty minutes five days a week     Eventually, increase the speed of your walking for 1-2 minutes at time    In addition, we recommend that you review healthy lifestyles and methods for weight loss available through the National Institutes of Health patient information sites:  http://win.niddk.nih.gov/publications/index.htm    And look into health and wellness programs that may be available through your health insurance provider, employer, local community center, or otto club.    Weight management plan: Patient was referred to their PCP to discuss a diet and exercise plan.    MY INFORMATION ON SLEEP APNEA-  Emile Navas    DOCTOR : Rizwan Umanzor  SLEEP CENTER :      MY CONTACT NUMBER:   [facility]  [phone]  [facility]   [phone]  [facility]   [phone]      [facility]  [phone]  [facility]   [phone]      [place]:  1. What is Obstructive Sleep Apnea (ARIELLA)? ARIELLA is the most common type of sleep apnea. Apnea literally means, \"without breath.\" It is characterized by repetitive pauses in breathing, despite continued effort to breathe, and is usually associated with a reduction in blood oxygen saturation. Apneas can last 10 to over 60 seconds. It is caused by narrowing or collapse of the upper airway as muscles relax during sleep.   2. What are the consequences of ARIELLA? Symptoms include: daytime sleepiness- possibly increasing the risk of falling asleep while driving, unrefreshing/restless sleep, snoring, insomnia, waking frequently to urinate, waking with heartburn or reflux, reduced concentration and memory, and morning headaches. Other health consequences may include development of high blood " pressure. Untreated ARIELLA also can contribute to heart disease, stroke and diabetes.   3. What are the treatment options? In most situations, sleep apnea is a lifelong disease that must be managed with daily therapy. Continuous Positive Airway (CPAP) is the most reliable treatment. A mouthguard to hold your jaw forward is usually the next most reliable option. Other options include postioning devices (to keep you off your back), nasal valves, tongue retaining device, weight loss, surgery. There is more detail about these options toward the end of this document.  4. What are the most important things to remember about using CPAP?     WHERE CAN I FIND MORE INFORMATION?    American Academy of Sleep Medicine Patient information on sleep disorders:  http://yoursleep.aasmnet.org    CPAP -  WHY AND HOW?                 Continuous positive airway pressure, or CPAP, is the most effective treatment for obstructive sleep apnea. It works by blowing room air, through a mask, to hold your throat open. A decision to use CPAP is a major step forward in the pursuit of a healthier life. The successful use of CPAP will help you breathe easier, sleep better and live healthier. Using CPAP can be a positive experience if you keep these eldridge points in mind:  1. Commitment  CPAP is not a quick fix for your problem. It involves a long-term commitment to improve your sleep and your health.    2. Communication  Stay in close communication with both your sleep doctor and your CPAP supplier. Ask lots of questions and seek help when you need it.    3. Consistency  Use CPAP all night, every night and for every nap. You will receive the maximum health benefits from CPAP when you use it every time that you sleep. This will also make it easier for your body to adjust to the treatment.    4. Correction  The first machine and mask that you try may not be the best ones for you. Work with your sleep doctor and your CPAP supplier to make corrections to your  "equipment selection. Ask about trying a different type of machine or mask if you have ongoing problems. Make sure that your mask is a good fit and learn to use your equipment properly.    5. Challenge  Tell a family member or close friend to ask you each morning if you used your CPAP the previous night. Have someone to challenge you to give it your best effort.    6. Connection   Your adjustment to CPAP will be easier if you are able to connect with others who use the same treatment. Ask your sleep doctor if there is a support group in your area for people who have sleep apnea, or look for one on the Internet.  7. Comfort   Increase your level of comfort by using a saline spray, decongestant or heated humidifier if CPAP irritates your nose, mouth or throat. Use your unit's \"ramp\" setting to slowly get used to the air pressure level. There may be soft pads you can buy that will fit over your mask straps. Look on www.CPAP.com for accessories that can help make CPAP use more comfortable.  8. Cleaning   Clean your mask, tubing and headgear on a regular basis. Put this time in your schedule so that you don't forget to do it. Check and replace the filters for your CPAP unit and humidifier.    9. Completion   Although you are never finished with CPAP therapy, you should reward yourself by celebrating the completion of your first month of treatment. Expect this first month to be your hardest period of adjustment. It will involve some trial and error as you find the machine, mask and pressure settings that are right for you.    10. Continuation  After your first month of treatment, continue to make a daily commitment to use your CPAP all night, every night and for every nap.    CPAP-Tips to starting with success:  Begin using your CPAP for short periods of time during the day while you watch TV or read.    Use CPAP every night and for every nap. Using it less often reduces the health benefits and makes it harder for your " body to get used to it.    Newer CPAP models are virtually silent; however, if you find the sound of your CPAP machine to be bothersome, place the unit under your bed to dampen the sound.     Make small adjustments to your mask, tubing, straps and headgear until you get the right fit. Tightening the mask may actually worsen the leak.  If it leaves significant marks on your face or irritates the bridge of your nose, it may not be the best mask for you.  Speak with the person who supplied the mask and consider trying other masks. Insurances will allow you to try different masks during the first month of starting CPAP.  Insurance also covers a new mask, hose and filter about every 6 months.    Use a saline nasal spray to ease mild nasal congestion. Neti-Pot or saline nasal rinses may also help. Nasal gel sprays can help reduce nasal dryness.  Biotene mouthwash can be helpful to protect your teeth if you experience frequent dry mouth.  Dry mouth may be a sign of air escaping out of your mouth or out of the mask in the case of a full face mask.  Speak with your provider if you expect that is the case.     Take a nasal decongestant to relieve more severe nasal or sinus congestion.  Do not use Afrin (oxymetazoline) nasal spray more than 3 days in a row.  Speak with your sleep doctor if your nasal congestion is chronic.    Use a heated humidifier that fits your CPAP model to enhance your breathing comfort. Adjust the heat setting up if you get a dry nose or throat, down if you get condensation in the hose or mask.  Position the CPAP lower than you so that any condensation in the hose drains back into the machine rather than towards the mask.    Try a system that uses nasal pillows if traditional masks give you problems.    Clean your mask, tubing and headgear once a week. Make sure the equipment dries fully.    Regularly check and replace the filters for your CPAP unit and humidifier.    Work closely with your sleep  provider and your CPAP supplier to make sure that you have the machine, mask and air pressure setting that works best for you. It is better to stop using it and call your provider to solve problems than to lay awake all night frustrated with the device.    BESIDES CPAP, WHAT OTHER THERAPIES ARE THERE?    Postioning devices if you only have the snoring or apnea while on your back    Dental devices if your condition is mild    Nasal valves may be effective though experience is limited    Weight loss if you are overweight    Surgery in limited cases where devices are not acceptable or there are problems with structures in the nose and throat  If treated with one of these alternative options, further evaluation is necessary to ensure that the therapy is effective. This may require some form of repeat testing.      Healthy Lifestyle:  Healthy diet, exercise and limit alcohol: Not only will excessive alcohol increase your weight over time, but it irritates the throat tissues and make them swell, shrinking the airway and causing snoring. Drinking alcohol should be limited and stopped within 3-4 hours before going to bed.   Stop smoking: (Red swollen throat, heat, nicotine), also irritates and swells the airway, among numerous other negative health consequences.    Positioning Device  This example shows a pillow that straps around the waist. It may be appropriate for those whose sleep study shows milder sleep apnea that occurs primarily when lying flat on one's back. Preliminary studies have shown benefit but effectiveness at home should be verified.                      Oral Appliance  These are examples of two of many custom-made devices that are more likely to work in mild sleep apnea   Oral appliances are dental mouth pieces that fit very much like a sports mouth guards or removable orthodontic retainers. They are used to treat snoring and obstructive sleep apnea . The device prevents the airway from collapsing by  either supporting the jaw in a forward position. Since oral appliances are non-invasive and easy to use, they may be considered as an early treatment option. Oral appliance therapy (OAT) involves the customization, selection, fabrication, fitting, adjustments and long-term follow-up care.  Custom made oral appliances are proven to be more effective than over-the-counter devices. Therefore, the over-the-counter devices are recommended not to be used as a screening tool nor as a therapeutic option.     Who gets a dental device?  Oral appliance therapy can be used as an alternative to CPAP therapy for the treatment of mild to moderate sleep apnea and for those patients who prefer OAT to CPAP. Oral appliance therapy is a first line therapy for the treatment of primary snoring. Additionally, OAT is an option for those that cannot tolerate CPAP as therapy or who have experienced insufficient surgical results.                 Possible side effects?  Frequent but minor side effects include: excessive salivation, dry mouth, discomfort of teeth and jaw and temporary changes in the patient s bite.  Potential complications include: jaw pain, permanent occlusal changes and TMJ symptoms.  The above mentioned side effects and complications can be recognized and managed by dentists trained in dental sleep medicine.   Finding a dentist that practices dental sleep medicine  Specific training is available through the American Academy of Dental Sleep Medicine for dentists interested in working in the field of sleep. To find a dentist who is educated in the field of sleep and the use of oral appliances, near you, visit the Web site of the American Academy of Dental Sleep Medicine; also see http://www.accpstorage.org/newOrganization/patients/oralAppliances.pdf   To search for a dentist certified in these practices:  Http://aadsm.org/FindADentist.aspx?1  Nasal Valves              Nasal valves may be an option for mild apnea if other  options are not well tolerated. The efficacy of these devices is generally less than CPAP or oral appliances.They may not be effective if you have frequent nasal congestion or have difficulty breathing through your nose.   Weight Loss:    Weight loss decreases severity of sleep apnea in most people with obesity. For those with mild obesity who have developed snoring with weight gain, even 15-30 pound weight loss can improve and occasionally eliminate sleep apnea.  Structured and life-long dietary and health habits are necessary to lose weight and keep healthier weight levels.     Though there are significant health benefits from weight loss, long-term weight loss is very difficult to achieve- studies show success with dietary management in less than 10% of people. In addition, substantial weight loss may require years of dietary control and may be difficult if patients have severe obesity. In these cases, surgical management may be considered.    If you are interested in methods for weight loss, you should review the options discussed at the National Institutes of Health patient information sites:     http://win.niddk.nih.gov/publications/index.htm  http:/www.health.nih.gov/topic/WeightLossDieting    Bariatric programs offer counseling in all methods of weight loss:    Http:/www.[facility].org/Specialties/WeightLossSurgeryandMedicalMgmt/htm    Your BMI is Body mass index is 31.02 kg/(m^2).    Weight management plan: Patient was referred to their PCP to discuss a diet and exercise plan.    Body mass index (BMI) is one way to tell whether you are at a healthy weight, overweight, or obese. It measures your weight in relation to your height.  A BMI of 18.5 to 24.9 is in the healthy range. A person with a BMI of 25 to 29.9 is considered overweight, and someone with a BMI of 30 or greater is considered obese.  Another way to find out if you are at risk for health problems caused by overweight and obesity is to  measure your waist. If you are a woman and your waist is more than 35 inches, or if you are a man and your waist is more than 40 inches, your risk of disease may be higher.  More than two-thirds of American adults are considered overweight or obese. Being overweight or obese increases the risk for further weight gain.  Excess weight may lead to heart disease and diabetes. Creating and following plans for healthy eating and physical activity may help you improve your health.    Methods for maintaining or losing weight.    Weight control is part of healthy lifestyle and includes exercise, emotional health, and healthy eating habits.  Careful eating habits lifelong is the mainstay of weight control.  Though there are significant health benefits from weight loss, long-term weight loss with diet alone may be very difficult to achieve- studies show long-term success with dietary management in less than 10% of people. Attaining a healthy weight may be especially difficult to achieve in those with severe obesity. In some cases, medications, devices and surgical management might be considered.    What can you do?    If you are overweight or obese and are interested in methods for weight loss, you should discuss this with your provider. In addition, we recommend that you review healthy life styles and methods for weight loss available through the National Institutes of Health patient information sites:     http://win.niddk.nih.gov/publications/index.htm      Surgery:  There are a number of surgeries that have been attempted to treat apnea. In general, surgical options are usually reserved for cases in which there is a physical abnormality contributing to obstruction or other treatment options are ineffective or not tolerated. Most surgical options are either unreliable or quite invasive. One of the more common procedures is:  Uvulopalatopharyngoplasty: In this procedure, the uvula (the finger-like tissue that hangs in the  "back of the throat), part of the soft palate (the tissue that the uvula is attached to), and sometimes the tonsils or adenoids are removed. The efficacy of this surgery is around 30-50% .  After surgery, complications may include:  Sleepiness and sleep apnea related to post-surgery medication   Swelling, infection and bleeding   A sore throat and/or difficulty swallowing   Drainage of secretions into the nose and a nasal quality to the voice. English language speech does not seem to be affected by this surgery.   Narrowing of the airway in the nose and throat (hence constricting breathing) snoring and even iatrogenically caused sleep apnea. By cutting the tissues, excess scar tissue can \"tighten\" the airway and make it even smaller than it was before UPPP.  Patients who have had the uvula removed will become unable to correctly speak Ecu[place]orean or any other language that has a uvular 'r' phoneme.    Surgeries to help resolve nasal congestion may help reduce the severity of apnea slightly. Nasal congestion does not cause apnea on its own, so these surgeries are usually not performed just for ARIELLA.  They may be worth considering if the nasal congestion is significantly bothersome independent of apnea.        "

## 2017-11-09 ENCOUNTER — OFFICE VISIT (OUTPATIENT)
Dept: SLEEP MEDICINE | Facility: CLINIC | Age: 37
End: 2017-11-09
Attending: INTERNAL MEDICINE
Payer: COMMERCIAL

## 2017-11-09 ENCOUNTER — DOCUMENTATION ONLY (OUTPATIENT)
Dept: SLEEP MEDICINE | Facility: CLINIC | Age: 37
End: 2017-11-09

## 2017-11-09 DIAGNOSIS — R06.83 SNORING: ICD-10-CM

## 2017-11-09 PROCEDURE — G0399 HOME SLEEP TEST/TYPE 3 PORTA: HCPCS | Mod: ZF | Performed by: INTERNAL MEDICINE

## 2017-11-09 PROCEDURE — G0399 HOME SLEEP TEST/TYPE 3 PORTA: HCPCS | Mod: ZF

## 2017-11-09 NOTE — PATIENT INSTRUCTIONS
My home sleep study:    ______I will activate the device as shown on the video    __X____My device is programmed to start automatically at     ____12:00 am_______Time   on ____11/9/2017__________  Day/Date    My contact number if I have problems is ________[phone]___________________________      I will watch the video before I hook it up at night: [email or web address]    In case of an emergency call [phone]

## 2017-11-09 NOTE — MR AVS SNAPSHOT
After Visit Summary   11/9/2017    Emile Navas    MRN: [id number]           Patient Information     Date Of Birth          1980        Visit Information        Provider Department      11/9/2017 11:00 AM SLEEP STUDY RM [facility]        Today's Diagnoses     Snoring          Care Instructions    My home sleep study:    ______I will activate the device as shown on the video    __X____My device is programmed to start automatically at     ____12:00 am_______Time   on ____11/9/2017__________  Day/Date    My contact number if I have problems is ________[phone]___________________________      I will watch the video before I hook it up at night: https://youtu.be/[email or web address]    In case of an emergency call 911                    Follow-ups after your visit        Your next 10 appointments already scheduled     Nov 10, 2017 10:00 AM CST   HST Drop Off with DME SCHEDULE   [facility] ([facility])    [address] [place] [id number] [phone]            Nov 21, 2017 10:00 AM CST   Return Sleep Patient with Rizwan Umanzor MD   [facility] ([facility])    [address] [place] [address] [phone]              Who to contact     If you have questions or need follow up information about today's clinic visit or your schedule please contact [facility] directly at [phone].  Normal or non-critical lab and imaging results will be communicated to you by MyChart, letter or phone within 4 business days after the clinic has received the results. If you do not hear from us within 7 days, please contact the clinic through MyChart or phone. If you have a critical or abnormal lab result, we will notify you by phone as soon as possible.  Submit refill requests through [employer] or call your pharmacy and they will forward  the refill request to us. Please allow 3 business days for your refill to be completed.          Additional Information About Your Visit        [employer] Information     [employer] gives you secure access to your electronic health record. If you see a primary care provider, you can also send messages to your care team and make appointments. If you have questions, please call your primary care clinic.  If you do not have a primary care provider, please call [phone] and they will assist you.        Care EveryWhere ID     This is your Care EveryWhere ID. This could be used by other organizations to access your [facility] medical records  [id number]         Blood Pressure from Last 3 Encounters:   10/27/17 122/80   06/27/17 114/65   04/26/17 121/62    Weight from Last 3 Encounters:   10/27/17 92.5 kg (204 lb)   06/27/17 91.9 kg (202 lb 11.2 oz)   04/26/17 90.7 kg (200 lb)              We Performed the Following     HST-Home Sleep Apnea Test        Primary Care Provider Office Phone # Fax #    Chris Alden Williamson [phone]       [address]  [facility] [address]        Equal Access to Services     FRANK FRAZIER : Hadii aad ku hadasho Soomaali, waaxda luqadaha, qaybta kaalmada adeegyada, td caleron raymond gonsalves. So [facility] [phone].    ATENCIÓN: Si habla español, tiene a crockett disposición servicios gratuitos de asistencia lingüística. [facility] [phone].    We comply with applicable federal civil rights laws and [place] laws. We do not discriminate on the basis of race, color, national origin, age, disability, sex, sexual orientation, or gender identity.            Thank you!     [facility]  [facility] your [facility]. Our goal is always to provide you with excellent care. Hearing back from our patients is one way we can continue to improve our services. Please take a few minutes to complete the written survey that you may receive in the mail after  your visit with us. Thank you!             Your Updated Medication List - Protect others around you: Learn how to safely use, store and throw away your medicines at www.disposemymeds.org.          This list is accurate as of: 11/9/17 11:06 AM.  Always use your most recent med list.                   Brand Name Dispense Instructions for use Diagnosis    citalopram 20 MG tablet    celeXA    90 tablet    Take 1 tablet (20 mg) by mouth daily    Generalized anxiety disorder       traMADol 50 MG tablet    ULTRAM    28 tablet    Take 1 tablet (50 mg) by mouth every 8 hours as needed for moderate pain    Lumbar disc disease

## 2017-11-09 NOTE — PROGRESS NOTES
"Patient presented to clinic for  and demonstration of the \"HST Device\". Patient was set up and instructed use. Patient verbalized understanding and will be returning device after 10 am.       Patient was given HST sleep logs and written instructions for use.        ANIYA Kauffman                      "

## 2017-11-10 ENCOUNTER — APPOINTMENT (OUTPATIENT)
Dept: SLEEP MEDICINE | Facility: CLINIC | Age: 37
End: 2017-11-10
Attending: INTERNAL MEDICINE
Payer: COMMERCIAL

## 2017-11-10 NOTE — PROGRESS NOTES
This HST performed using a Noxturnal T3 device which recorded snore, sound, movement activity, body position, nasal pressure, oronasal thermal airflow, pulse, oximetry and both chest and abdominal respiratory effort. HST data was confined to the time patients states they were in bed.   Patient was score using 1B rules. Patient respiratory events showed an AHI of 13.6 with rare snoring. Overall signal quality was good.    Pt will follow up with sleep provider to determine appropriate therapy.

## 2017-11-16 ENCOUNTER — MYC REFILL (OUTPATIENT)
Dept: FAMILY MEDICINE | Facility: CLINIC | Age: 37
End: 2017-11-16

## 2017-11-16 DIAGNOSIS — M51.9 LUMBAR DISC DISEASE: ICD-10-CM

## 2017-11-16 NOTE — PROCEDURES
PHYSICIAN INTERPRETATION   HOME SLEEP STUDY   Emile Navas  MRN: [id number]  : 1980  Ordering Physician:     Indications for Home Study: Emile Navas is a 37 year old male with BMI of 31.0 kg/m2. Patient reports snoring, snort arousals,  witnessed apneas, dry mouth, occasional morning headaches, non-refreshing sleep, daytime sleepiness/fatigue.  [place] sleepiness score was 12 /24.  STOP-BANG score was 5.  Home sleep study was obtained to evaluate for probable sleep disordered breathing.    Data: A full night home sleep study was performed recording the standard physiologic parameters including body position, movement, nasal pressure, thermal oral airflow, chest and abdominal movements with respiratory inductance plethysmography, and oxygen saturation by pulse oximetry. Pulse rate was estimated by oximetry recording. This study was considered adequate based on > 4 hours of quality oximetry and respiratory recording.     ANALYSIS TIME: 352.2 minutes     Respiration:     Respiratory events - The disordered breathing events that were observed during the study include: 49 obstructive, 29 mixed and central apneas. There were 3 hypopneas resulting in a combined apnea/hypopnea index [AHI] of  13.8 events  per hour and 25.5 per hour supine.     Sleep Associated Hypoxemia - hypoxemia was not present. Baseline oxygen saturation was 95.1%. The lowest oxygen saturation was 89%. Time spent at or below 88% saturation was 0 minutes. Snoring was reported as rare.      Pulse - Average pulse rate was 52 beats per minute.    Assessment:   1. Mild obstructive sleep apnea, pronounced during sleep in supine position.    Recommendations:  1. Suggest one of the following options: A) pursuing a supervised sleep study with all night titration using CPAP device to establish the optimum pressures settings to control the sleep related breathing disorder  (OR) B) initiation of empiric treatment with auto titrating CPAP device  with min pressure= 5 to 7 cms of water based on patient's comfort and maximum pressures settings at 15 cms of water with clinical follow up to review compliance measures. Suggest obtaining an overnight oximetry with aforementioned auto CPAP settings in 2 weeks OR) C)  oral appliance via referral to Dentistry (OR) D) evaluation for upper airway surgical options through referral to ENT provider.  2. Suggest optimizing sleep hygiene and avoiding sleep deprivation.  3. Suggest advising the patient not to drive if drowsy/sleepy to prevent accidents.  4. Weight management     Diagnosis Code(s): Obstructive Sleep Apnea G47.33                                                                               Avelino Jackson MD, FAASM Nov 16, 2017                                                                            Diplomate, American Board of Internal Medicine, Sleep Medicine

## 2017-11-16 NOTE — TELEPHONE ENCOUNTER
Message from Flasmat:  Original authorizing provider: MD Emile Rose would like a refill of the following medications:  traMADol (ULTRAM) 50 MG tablet [Chris Williamson MD]    Preferred pharmacy: [facility] [address] [place] [address] [address] AT [place] [address]    Comment:

## 2017-11-17 RX ORDER — TRAMADOL HYDROCHLORIDE 50 MG/1
TABLET ORAL
Qty: 28 TABLET | Refills: 0 | OUTPATIENT
Start: 2017-11-17

## 2017-11-17 RX ORDER — TRAMADOL HYDROCHLORIDE 50 MG/1
50 TABLET ORAL DAILY PRN
Qty: 28 TABLET | Refills: 0 | Status: SHIPPED | OUTPATIENT
Start: 2017-11-17 | End: 2017-12-11

## 2017-11-21 ENCOUNTER — OFFICE VISIT (OUTPATIENT)
Dept: SLEEP MEDICINE | Facility: CLINIC | Age: 37
End: 2017-11-21
Attending: INTERNAL MEDICINE
Payer: COMMERCIAL

## 2017-11-21 VITALS
DIASTOLIC BLOOD PRESSURE: 83 MMHG | HEIGHT: 68 IN | OXYGEN SATURATION: 97 % | HEART RATE: 66 BPM | SYSTOLIC BLOOD PRESSURE: 130 MMHG | RESPIRATION RATE: 16 BRPM | WEIGHT: 204 LBS | BODY MASS INDEX: 30.92 KG/M2

## 2017-11-21 DIAGNOSIS — G47.33 OSA (OBSTRUCTIVE SLEEP APNEA): Primary | ICD-10-CM

## 2017-11-21 PROCEDURE — 99211 OFF/OP EST MAY X REQ PHY/QHP: CPT | Mod: ZF

## 2017-11-21 NOTE — PROGRESS NOTES
"Sleep medicine clinic follow-up visit note    Date of sleep Clinic visit: November 21, 2017    Purpose of visit: review results of home sleep test (HST)    History of present illness:Mr. Emile Navas, is a 37-year-old male who underwent home sleep test on November 9, 2017. He presents to sleep clinic today to review the test results.    HST results: The disordered breathing events that were observed during the study include: 49 obstructive, 29 mixed and central apneas. There were 3 hypopneas resulting in a combined apnea/hypopnea index [AHI] of  13.8 events  per hour and 25.5 per hour supine.   Sleep Associated Hypoxemia - hypoxemia was not present. Baseline oxygen saturation was 95.1%. The lowest oxygen saturation was 89%. Time spent at or below 88% saturation was 0 minutes. Snoring was reported as rare.    The results of the HST were discussed with the patient in detail.       Current meds, Past medical history, Past surgical history, Allergies, Social history, Family history: reviewed, per EMR    Physical exam:  /83  Pulse 66  Resp 16  Ht 1.727 m (5' 8\")  Wt 92.5 kg (204 lb)  SpO2 97%  BMI 31.02 kg/m2  General appearance:  in no apparent distress  Pt is dressed casually, cooperative with good eye contact.   Speech is spontaneous with regular rate and volume.   Mood: euthymic; affect congruent with full range and intensity.   Sensorium: awake, alert and oriented to person, place, time, and situation.    Assessment:   1. Mild obstructive sleep apnea, pronounced during sleep in supine position. We discussed the various treatment options for sleep apnea and finally the patient was agreeable to using positional therapy. He was provided with prescription for the FDA approved zzoma pillow  to help with positional restriction during sleep.  He was instructed to call our clinic once he obtains the zzoma pillow in order to schedule a HST which will be obtained while he is using the zzoma pillow to check the " "effectiveness of the positional therapy in the treatment of sleep apnea. The orders for the future HST have been generated in Epic. The results of the HST will be discussed via  virtual visit in one week to 10 days after the test is completed.  We discussed weight management. He was instructed to avoid driving/operating heavy machinery if drowsy or sleepy to prevent accidents.      The above note was dictated using voice recognition software. Although reviewed after completion, some word and grammatical error may remain . Please contact the author for any clarifications.    \"I spent a total of 15 minutes face to face with Emile Navas during today's office visit. Over 50% of this time was spent counseling the patient and  coordinating care regarding sleep apnea and positional therapy.\"       Avelino Jackson MD   of Medicine,  Division of Pulmonary/Sleep Medicine  [facility].    "

## 2017-11-21 NOTE — NURSING NOTE
"Chief Complaint   Patient presents with     Study Results     HST       Initial /83  Pulse 66  Resp 16  Ht 1.727 m (5' 8\")  Wt 92.5 kg (204 lb)  SpO2 97%  BMI 31.02 kg/m2 Estimated body mass index is 31.02 kg/(m^2) as calculated from the following:    Height as of this encounter: 1.727 m (5' 8\").    Weight as of this encounter: 92.5 kg (204 lb).  Medication Reconciliation: complete   Radha Chen [facility]      "

## 2017-11-21 NOTE — MR AVS SNAPSHOT
After Visit Summary   11/21/2017    Emile Navas    MRN: [id number]           Patient Information     Date Of Birth          1980        Visit Information        Provider Department      11/21/2017 10:00 AM Rizwan Umanzor MD [facility] [place], Sleep Study        Today's Diagnoses     ARIELLA (obstructive sleep apnea)    -  1      Care Instructions      Your BMI is Body mass index is 31.02 kg/(m^2).  Weight management is a personal decision.  If you are interested in exploring weight loss strategies, the following discussion covers the approaches that may be successful. Body mass index (BMI) is one way to tell whether you are at a healthy weight, overweight, or obese. It measures your weight in relation to your height.  A BMI of 18.5 to 24.9 is in the healthy range. A person with a BMI of 25 to 29.9 is considered overweight, and someone with a BMI of 30 or greater is considered obese. More than two-thirds of American adults are considered overweight or obese.  Being overweight or obese increases the risk for further weight gain. Excess weight may lead to heart disease and diabetes.  Creating and following plans for healthy eating and physical activity may help you improve your health.  Weight control is part of healthy lifestyle and includes exercise, emotional health, and healthy eating habits. Careful eating habits lifelong are the mainstay of weight control. Though there are significant health benefits from weight loss, long-term weight loss with diet alone may be very difficult to achieve- studies show long-term success with dietary management in less than 10% of people. Attaining a healthy weight may be especially difficult to achieve in those with severe obesity. In some cases, medications, devices and surgical management might be considered.  What can you do?  If you are overweight or obese and are interested in methods for weight loss, you should discuss this with your provider.      Consider reducing daily calorie intake by 500 calories.     Keep a food journal.     Avoiding skipping meals, consider cutting portions instead.    Diet combined with exercise helps maintain muscle while optimizing fat loss. Strength training is particularly important for building and maintaining muscle mass. Exercise helps reduce stress, increase energy, and improves fitness. Increasing exercise without diet control, however, may not burn enough calories to loose weight.       Start walking three days a week 10-20 minutes at a time    Work towards walking thirty minutes five days a week     Eventually, increase the speed of your walking for 1-2 minutes at time    In addition, we recommend that you review healthy lifestyles and methods for weight loss available through the National Institutes of Health patient information sites:  http://win.niddk.nih.gov/publications/index.htm    And look into health and wellness programs that may be available through your health insurance provider, employer, local community center, or otto club.    Weight management plan: Patient was referred to their PCP to discuss a diet and exercise plan.              Follow-ups after your visit        Future tests that were ordered for you today     Open Future Orders        Priority Expected Expires Ordered    HST-Home Sleep Apnea Test Routine  5/23/2018 11/21/2017            Who to contact     If you have questions or need follow up information about today's clinic visit or your schedule please contact [facility] directly at [phone].  Normal or non-critical lab and imaging results will be communicated to you by MyChart, letter or phone within 4 business days after the clinic has received the results. If you do not hear from us within 7 days, please contact the clinic through MyChart or phone. If you have a critical or abnormal lab result, we will notify you by phone as soon as possible.  Submit refill requests  "through [employer] or call your pharmacy and they will forward the refill request to us. Please allow 3 business days for your refill to be completed.          Additional Information About Your Visit        [employer] Information     [employer] gives you secure access to your electronic health record. If you see a primary care provider, you can also send messages to your care team and make appointments. If you have questions, please call your primary care clinic.  If you do not have a primary care provider, please call [phone] and they will assist you.        Care EveryWhere ID     This is your Care EveryWhere ID. This could be used by other organizations to access your [facility] medical records  [id number]        Your Vitals Were     Pulse Respirations Height Pulse Oximetry BMI (Body Mass Index)       66 16 1.727 m (5' 8\") 97% 31.02 kg/m2        Blood Pressure from Last 3 Encounters:   11/21/17 130/83   10/27/17 122/80   06/27/17 114/65    Weight from Last 3 Encounters:   11/21/17 92.5 kg (204 lb)   10/27/17 92.5 kg (204 lb)   06/27/17 91.9 kg (202 lb 11.2 oz)              We Performed the Following     Sleep Positioning Device  ()        Primary Care Provider Office Phone # Fax #    Chris Ruano Noe Williamson [phone] [address]  [facility] [address]        Equal Access to Services     [facility] AH: Hadii aad ku hadasho Soomaali, waaxda luqadaha, qaybta kaalmada adeegyada, td carrillo haycarroll dominguez . So [facility] [phone].    ATENCIÓN: Si habla español, tiene a crockett disposición servicios gratuitos de asistencia lingüística. Llame al [phone].    We comply with applicable federal civil rights laws and [place] laws. We do not discriminate on the basis of race, color, national origin, age, disability, sex, sexual orientation, or gender identity.            Thank you!     Thank you for choosing [facility] SLEEP STUDY  for your care. Our goal is always to provide " you with excellent care. Hearing back from our patients is one way we can continue to improve our services. Please take a few minutes to complete the written survey that you may receive in the mail after your visit with us. Thank you!             Your Updated Medication List - Protect others around you: Learn how to safely use, store and throw away your medicines at www.disposemymeds.org.          This list is accurate as of: 11/21/17  4:41 PM.  Always use your most recent med list.                   Brand Name Dispense Instructions for use Diagnosis    citalopram 20 MG tablet    celeXA    90 tablet    Take 1 tablet (20 mg) by mouth daily    Generalized anxiety disorder       traMADol 50 MG tablet    ULTRAM    28 tablet    Take 1 tablet (50 mg) by mouth daily as needed for moderate pain    Lumbar disc disease

## 2017-11-21 NOTE — PATIENT INSTRUCTIONS

## 2017-12-11 ENCOUNTER — MYC REFILL (OUTPATIENT)
Dept: FAMILY MEDICINE | Facility: CLINIC | Age: 37
End: 2017-12-11

## 2017-12-11 DIAGNOSIS — M51.9 LUMBAR DISC DISEASE: ICD-10-CM

## 2017-12-11 RX ORDER — TRAMADOL HYDROCHLORIDE 50 MG/1
50 TABLET ORAL DAILY PRN
Qty: 28 TABLET | Refills: 0 | Status: SHIPPED | OUTPATIENT
Start: 2017-12-11 | End: 2018-01-05

## 2017-12-11 NOTE — TELEPHONE ENCOUNTER
Message from dateIITianst:  Original authorizing provider: MD Emile Rose would like a refill of the following medications:  traMADol (ULTRAM) 50 MG tablet [Chris Williamson MD]    Preferred pharmacy: [facility] [address] [place] [address] [address] AT [place] [address]    Comment:

## 2017-12-12 NOTE — TELEPHONE ENCOUNTER
Faxed Rx for traMADol (ULTRAM) 50 MG tablet to [facility]'s pharmacy. Fax:[phone]      Rochelle Giang MA

## 2018-01-05 ENCOUNTER — OFFICE VISIT (OUTPATIENT)
Dept: FAMILY MEDICINE | Facility: CLINIC | Age: 38
End: 2018-01-05
Payer: COMMERCIAL

## 2018-01-05 VITALS
TEMPERATURE: 97 F | HEART RATE: 66 BPM | HEIGHT: 68 IN | BODY MASS INDEX: 30.58 KG/M2 | DIASTOLIC BLOOD PRESSURE: 79 MMHG | SYSTOLIC BLOOD PRESSURE: 119 MMHG | WEIGHT: 201.8 LBS | RESPIRATION RATE: 16 BRPM

## 2018-01-05 DIAGNOSIS — M51.9 LUMBAR DISC DISEASE: ICD-10-CM

## 2018-01-05 DIAGNOSIS — Z00.00 ROUTINE GENERAL MEDICAL EXAMINATION AT A HEALTH CARE FACILITY: Primary | ICD-10-CM

## 2018-01-05 DIAGNOSIS — G47.39 OTHER SLEEP APNEA: ICD-10-CM

## 2018-01-05 DIAGNOSIS — Z13.220 LIPID SCREENING: ICD-10-CM

## 2018-01-05 PROCEDURE — 99395 PREV VISIT EST AGE 18-39: CPT | Performed by: FAMILY MEDICINE

## 2018-01-05 RX ORDER — TRAMADOL HYDROCHLORIDE 50 MG/1
50 TABLET ORAL DAILY PRN
Qty: 28 TABLET | Refills: 0 | Status: SHIPPED | OUTPATIENT
Start: 2018-01-05 | End: 2018-02-05

## 2018-01-05 NOTE — PATIENT INSTRUCTIONS
Preventive Health Recommendations:       Male Ages 65 and over    Yearly exam:             See your health care provider every year in order to  o   Review health changes.   o   Discuss preventive care.    o   Review your medicines if your doctor has prescribed any.    Talk with your health care provider about whether you should have a test to screen for prostate cancer (PSA).    Every 3 years, have a diabetes test (fasting glucose). If you are at risk for diabetes, you should have this test more often.    Every 5 years, have a cholesterol test. Have this test more often if you are at risk for high cholesterol or heart disease.     Every 10 years, have a colonoscopy. Or, have a yearly FIT test (stool test). These exams will check for colon cancer.    Talk to with your health care provider about screening for Abdominal Aortic Aneurysm if you have a family history of AAA or have a history of smoking.  Shots:     Get a flu shot each year.     Get a tetanus shot every 10 years.     Talk to your doctor about your pneumonia vaccines. There are now two you should receive - Pneumovax (PPSV 23) and Prevnar (PCV 13).    Talk to your doctor about a shingles vaccine.     Talk to your doctor about the hepatitis B vaccine.  Nutrition:     Eat at least 5 servings of fruits and vegetables each day.     Eat whole-grain bread, whole-wheat pasta and brown rice instead of white grains and rice.     Talk to your doctor about Calcium and Vitamin D.   Lifestyle    Exercise for at least 150 minutes a week (30 minutes a day, 5 days a week). This will help you control your weight and prevent disease.     Limit alcohol to one drink per day.     No smoking.     Wear sunscreen to prevent skin cancer.     See your dentist every six months for an exam and cleaning.     See your eye doctor every 1 to 2 years to screen for conditions such as glaucoma, macular degeneration and cataracts.        Consider decreasing intensity of weight training to  75% of current effort/weight.  Consider ways to improve your nutrition. Consider ways to add easy new healthy recipes to the mix.  Schedule lab only appointment for fasting cholesterol in near future.

## 2018-01-05 NOTE — MR AVS SNAPSHOT
After Visit Summary   1/5/2018    Emile Navas    MRN: [id number]           Patient Information     Date Of Birth          1980        Visit Information        Provider Department      1/5/2018 10:45 AM Chris Mackey MD [facility]        Today's Diagnoses     Lipid screening    -  1    Other sleep apnea        Lumbar disc disease          Care Instructions      Preventive Health Recommendations:       Male Ages 65 and over    Yearly exam:             See your health care provider every year in order to  o   Review health changes.   o   Discuss preventive care.    o   Review your medicines if your doctor has prescribed any.    Talk with your health care provider about whether you should have a test to screen for prostate cancer (PSA).    Every 3 years, have a diabetes test (fasting glucose). If you are at risk for diabetes, you should have this test more often.    Every 5 years, have a cholesterol test. Have this test more often if you are at risk for high cholesterol or heart disease.     Every 10 years, have a colonoscopy. Or, have a yearly FIT test (stool test). These exams will check for colon cancer.    Talk to with your health care provider about screening for Abdominal Aortic Aneurysm if you have a family history of AAA or have a history of smoking.  Shots:     Get a flu shot each year.     Get a tetanus shot every 10 years.     Talk to your doctor about your pneumonia vaccines. There are now two you should receive - Pneumovax (PPSV 23) and Prevnar (PCV 13).    Talk to your doctor about a shingles vaccine.     Talk to your doctor about the hepatitis B vaccine.  Nutrition:     Eat at least 5 servings of fruits and vegetables each day.     Eat whole-grain bread, whole-wheat pasta and brown rice instead of white grains and rice.     Talk to your doctor about Calcium and Vitamin D.   Lifestyle    Exercise for at least 150 minutes a week (30 minutes a day,  5 days a week). This will help you control your weight and prevent disease.     Limit alcohol to one drink per day.     No smoking.     Wear sunscreen to prevent skin cancer.     See your dentist every six months for an exam and cleaning.     See your eye doctor every 1 to 2 years to screen for conditions such as glaucoma, macular degeneration and cataracts.        Consider decreasing intensity of weight training to 75% of current effort/weight.  Consider ways to improve your nutrition. Consider ways to add easy new healthy recipes to the mix.  Schedule lab only appointment for fasting cholesterol in near future.          Follow-ups after your visit        Future tests that were ordered for you today     Open Future Orders        Priority Expected Expires Ordered    Hemoglobin Routine  1/5/2019 1/5/2018    Lipid panel reflex to direct LDL Fasting Routine  1/5/2019 1/5/2018            Who to contact     If you have questions or need follow up information about today's clinic visit or your schedule please contact [facility] directly at [phone].  Normal or non-critical lab and imaging results will be communicated to you by [employer], letter or phone within 4 business days after the clinic has received the results. If you do not hear from us within 7 days, please contact the clinic through [employer] or phone. If you have a critical or abnormal lab result, we will notify you by phone as soon as possible.  Submit refill requests through [employer] or call your pharmacy and they will forward the refill request to us. Please allow 3 business days for your refill to be completed.          Additional Information About Your Visit        [employer]inn[employer] Information     [employer] gives you secure access to your electronic health record. If you see a primary care provider, you can also send messages to your care team and make appointments. If you have questions, please call your primary care clinic.  If you do not have a  "primary care provider, please call [phone] and they will assist you.        Care EveryWhere ID     This is your Care EveryWhere ID. This could be used by other organizations to access your [facility] medical records  [id number]        Your Vitals Were     Pulse Temperature Respirations Height BMI (Body Mass Index)       66 97  F (36.1  C) (Oral) 16 5' 8\" (1.727 m) 30.68 kg/m2        Blood Pressure from Last 3 Encounters:   01/05/18 119/79   11/21/17 130/83   10/27/17 122/80    Weight from Last 3 Encounters:   01/05/18 201 lb 12.8 oz (91.5 kg)   11/21/17 204 lb (92.5 kg)   10/27/17 204 lb (92.5 kg)                 Where to get your medicines      Some of these will need a paper prescription and others can be bought over the counter.  Ask your nurse if you have questions.     Bring a paper prescription for each of these medications     traMADol 50 MG tablet          Primary Care Provider Office Phone # Fax #    Chris Alden Williamson [phone]       [address]  [facility] [address]        Equal Access to Services     FRANK FRAZIER AH: Hadii shelia johnsono Sorigoali, waaxda luqadaha, qaybta kaalmada adeegyada, td gonsalves. [facility] [phone].    ATENCIÓN: Si habla español, tiene a crockett disposición servicios gratuitos de asistencia lingüística. [facility] [phone].    We comply with applicable federal civil rights laws and [place] laws. We do not discriminate on the basis of race, color, national origin, age, disability, sex, sexual orientation, or gender identity.            Thank you!     Thank you for choosing [facility]  for your care. Our goal is always to provide you with excellent care. Hearing back from our patients is one way we can continue to improve our services. Please take a few minutes to complete the written survey that you may receive in the mail after your visit with us. Thank you!             Your Updated Medication List " - Protect others around you: Learn how to safely use, store and throw away your medicines at www.disposemymeds.org.          This list is accurate as of: 1/5/18 12:17 PM.  Always use your most recent med list.                   Brand Name Dispense Instructions for use Diagnosis    citalopram 20 MG tablet    celeXA    90 tablet    Take 1 tablet (20 mg) by mouth daily    Generalized anxiety disorder       traMADol 50 MG tablet    ULTRAM    28 tablet    Take 1 tablet (50 mg) by mouth daily as needed for moderate pain    Lumbar disc disease

## 2018-01-05 NOTE — PROGRESS NOTES
SUBJECTIVE:   CC: Emile Navas is an 37 year old male who presents for preventative health visit.     Physical   Annual:     Getting at least 3 servings of Calcium per day::  Yes    Bi-annual eye exam::  NO    Dental care twice a year::  Yes    Sleep apnea or symptoms of sleep apnea::  Sleep apnea    Diet::  Regular (no restrictions)    Frequency of exercise::  2-3 days/week    Duration of exercise::  Greater than 60 minutes    Taking medications regularly::  Yes    Medication side effects::  None    Additional concerns today::  No          Physical Activity: Able to run. Weights  Nutrition: No particular approach. Eats out when at work. Cooks random things at home. Has room for improvement.     Did have a sleep study, has sleep apnea on back but not on side and manages well with sleeping on his side.    Back is doing well. A little more stiff recently, better since surgery. He can run and bend yelena. Able to work without restrictions.    He uses tramadol for wrist pain relating to broken scaphoid. Had two surgeries. Has been trying to build up muscles since his back surgery and notes his wrist has been bothered by this.    Celexa is helping.    Today's PHQ-2 Score:   PHQ-2 ( 1999 Pfizer) 4/24/2017   Q1: Little interest or pleasure in doing things 0   Q2: Feeling down, depressed or hopeless 0   PHQ-2 Score 0   Q1: Little interest or pleasure in doing things -   Q2: Feeling down, depressed or hopeless -   PHQ-2 Score -       Abuse: Current or Past(Physical, Sexual or Emotional)- No  Do you feel safe in your environment - Yes    Social History   Substance Use Topics     Smoking status: Never Smoker     Smokeless tobacco: Never Used     Alcohol use Yes      Comment: Rarely     No flowsheet data found.    Last PSA: No results found for: PSA    Reviewed orders with patient. Reviewed health maintenance and updated orders accordingly -     Reviewed and updated as needed this visit by clinical staff  Tobacco   "Allergies  Meds  Problems  Med Hx  Surg Hx  Fam Hx  Soc Hx          Reviewed and updated as needed this visit by Provider  Allergies  Meds  Problems          Review of Systems  C: NEGATIVE for fever, chills, change in weight  I: NEGATIVE for worrisome rashes, moles or lesions  E: NEGATIVE for vision changes or irritation  ENT: NEGATIVE for ear, mouth and throat problems  R: NEGATIVE for significant cough or SOB  CV: NEGATIVE for chest pain, palpitations or peripheral edema  GI: NEGATIVE for nausea, abdominal pain, heartburn, or change in bowel habits   male: negative for dysuria, hematuria, decreased urinary stream, erectile dysfunction, urethral discharge  M: NEGATIVE for significant arthralgias or myalgia  N: NEGATIVE for weakness, dizziness or paresthesias  P: NEGATIVE for changes in mood or affect    OBJECTIVE:   /79  Pulse 66  Temp 97  F (36.1  C) (Oral)  Resp 16  Ht 5' 8\" (1.727 m)  Wt 201 lb 12.8 oz (91.5 kg)  BMI 30.68 kg/m2    Physical Exam  GENERAL: healthy, alert and no distress  EYES: Eyes grossly normal to inspection, PERRL and conjunctivae and sclerae normal  HENT: ear canals and TM's normal, nose and mouth without ulcers or lesions  NECK: no adenopathy, no asymmetry, masses, or scars and thyroid normal to palpation  RESP: lungs clear to auscultation - no rales, rhonchi or wheezes  CV: regular rate and rhythm, normal S1 S2, no S3 or S4, no murmur, click or rub, no peripheral edema and peripheral pulses strong  ABDOMEN: soft, nontender, no hepatosplenomegaly, no masses and bowel sounds normal  MS: no gross musculoskeletal defects noted, no edema  SKIN: no suspicious lesions or rashes  NEURO: Normal strength and tone, mentation intact and speech normal  PSYCH: mentation appears normal, affect normal/bright    ASSESSMENT/PLAN:   Emile was seen today for physical.    Diagnoses and all orders for this visit:    Routine general medical examination at a health care facility    Lipid " "screening  -     Lipid panel reflex to direct LDL Fasting; Future    Other sleep apnea  -     Hemoglobin; Future    Lumbar disc disease  -     traMADol (ULTRAM) 50 MG tablet; Take 1 tablet (50 mg) by mouth daily as needed for moderate pain        COUNSELING:   Reviewed preventive health counseling, as reflected in patient instructions       Regular exercise       Healthy diet/nutrition         reports that he has never smoked. He has never used smokeless tobacco.    Estimated body mass index is 30.68 kg/(m^2) as calculated from the following:    Height as of this encounter: 5' 8\" (1.727 m).    Weight as of this encounter: 201 lb 12.8 oz (91.5 kg).       Counseling Resources:  ATP IV Guidelines  Pooled Cohorts Equation Calculator  FRAX Risk Assessment  ICSI Preventive Guidelines  Dietary Guidelines for Americans, 2010  USDA's MyPlate  ASA Prophylaxis  Lung CA Screening    Chris Williamson MD  [facility]      "

## 2018-03-05 ENCOUNTER — MYC REFILL (OUTPATIENT)
Dept: FAMILY MEDICINE | Facility: CLINIC | Age: 38
End: 2018-03-05

## 2018-03-05 DIAGNOSIS — M51.9 LUMBAR DISC DISEASE: ICD-10-CM

## 2018-03-05 RX ORDER — TRAMADOL HYDROCHLORIDE 50 MG/1
50 TABLET ORAL DAILY PRN
Qty: 28 TABLET | Refills: 0 | Status: SHIPPED | OUTPATIENT
Start: 2018-03-05 | End: 2018-04-02

## 2018-03-05 NOTE — TELEPHONE ENCOUNTER
Message from [employer]:  Original authorizing provider: MD Emile Rose would like a refill of the following medications:  traMADol (ULTRAM) 50 MG tablet [Chris Williamson MD]    Preferred pharmacy: [facility] [address] [place] [address] [address] AT [place] [address]    Comment:

## 2018-03-19 ENCOUNTER — MYC MEDICAL ADVICE (OUTPATIENT)
Dept: FAMILY MEDICINE | Facility: CLINIC | Age: 38
End: 2018-03-19

## 2018-04-02 ENCOUNTER — MYC REFILL (OUTPATIENT)
Dept: FAMILY MEDICINE | Facility: CLINIC | Age: 38
End: 2018-04-02

## 2018-04-02 DIAGNOSIS — M51.9 LUMBAR DISC DISEASE: ICD-10-CM

## 2018-04-02 RX ORDER — TRAMADOL HYDROCHLORIDE 50 MG/1
50 TABLET ORAL DAILY PRN
Qty: 28 TABLET | Refills: 0 | Status: SHIPPED | OUTPATIENT
Start: 2018-04-02 | End: 2018-05-02

## 2018-04-02 NOTE — TELEPHONE ENCOUNTER
Message from [email or web address]:  Original authorizing provider: MD Emile Rose would like a refill of the following medications:  traMADol (ULTRAM) 50 MG tablet [Chris Williamson MD]    Preferred pharmacy: [facility] [address] [place] [address] [address] AT [place] [address]    Comment:

## 2018-04-03 NOTE — TELEPHONE ENCOUNTER
Faxed Rx for tramadol (ULTRAM) 50 mg tablets to [facility]. Fax:[phone].    Rochelle Giang MA

## 2018-05-02 ENCOUNTER — MYC REFILL (OUTPATIENT)
Dept: FAMILY MEDICINE | Facility: CLINIC | Age: 38
End: 2018-05-02

## 2018-05-02 DIAGNOSIS — M51.9 LUMBAR DISC DISEASE: ICD-10-CM

## 2018-05-02 RX ORDER — TRAMADOL HYDROCHLORIDE 50 MG/1
50 TABLET ORAL DAILY PRN
Qty: 28 TABLET | Refills: 0 | Status: SHIPPED | OUTPATIENT
Start: 2018-05-02 | End: 2018-05-30

## 2018-05-02 NOTE — TELEPHONE ENCOUNTER
Message from Testint:  Original authorizing provider: MD Emile Rose would like a refill of the following medications:  traMADol (ULTRAM) 50 MG tablet [Chris Williamson MD]    Preferred pharmacy: [facility] [address] [place] [address] [address] AT [place] [address]    Comment:

## 2018-05-02 NOTE — TELEPHONE ENCOUNTER
Requested Prescriptions   Pending Prescriptions Disp Refills     traMADol (ULTRAM) 50 MG tablet 28 tablet 0     Sig: Take 1 tablet (50 mg) by mouth daily as needed for moderate pain    There is no refill protocol information for this order

## 2018-06-02 NOTE — TELEPHONE ENCOUNTER
Discontinues waters catheter per order. Explained procedure to patient and wife. Both expressed understanding, patient tolerated procedure well, urinal at bedside.   Faxed rx of tramadol 50 mg tablet to [facility] on [address].    Riky Amor MA

## 2018-07-25 ENCOUNTER — MYC REFILL (OUTPATIENT)
Dept: FAMILY MEDICINE | Facility: CLINIC | Age: 38
End: 2018-07-25

## 2018-07-25 DIAGNOSIS — M51.9 LUMBAR DISC DISEASE: ICD-10-CM

## 2018-07-25 DIAGNOSIS — F41.1 GENERALIZED ANXIETY DISORDER: ICD-10-CM

## 2018-07-26 NOTE — TELEPHONE ENCOUNTER
Message from [employer]:  Original authorizing provider: MD Emile Rose would like a refill of the following medications:  traMADol (ULTRAM) 50 MG tablet [Chris Williamson MD]    Preferred pharmacy: [facility] [address] [place] [address] [address] AT [place] [address]    Comment:

## 2018-07-26 NOTE — TELEPHONE ENCOUNTER
Pt calling about status of refill request for Tramadol and Celexa. Pt has office visit scheduled with Dr. Noe Williamson on 8/13/18. [facility] in [place].

## 2018-07-26 NOTE — TELEPHONE ENCOUNTER
Message from [employer]:  Original authorizing provider: MD Emile Rose would like a refill of the following medications:  citalopram (CELEXA) 20 MG tablet [Chris Williamson MD]    Preferred pharmacy: [facility] [address] [place] [address] [address] AT [place] [address]    Comment:

## 2018-07-27 RX ORDER — CITALOPRAM HYDROBROMIDE 20 MG/1
20 TABLET ORAL DAILY
Qty: 90 TABLET | Refills: 3 | Status: SHIPPED | OUTPATIENT
Start: 2018-07-27 | End: 2018-09-17

## 2018-07-27 RX ORDER — TRAMADOL HYDROCHLORIDE 50 MG/1
50 TABLET ORAL DAILY PRN
Qty: 28 TABLET | Refills: 0 | Status: SHIPPED | OUTPATIENT
Start: 2018-07-27 | End: 2018-08-21

## 2018-07-27 NOTE — TELEPHONE ENCOUNTER
Patient is currently out of medication and hoping this can be approved today as PCP is in clinic and he made a follow up appointment. Please assist. Thanks!

## 2018-08-01 RX ORDER — TRAMADOL HYDROCHLORIDE 50 MG/1
TABLET ORAL
Qty: 28 TABLET | Refills: 0 | OUTPATIENT
Start: 2018-08-01

## 2018-08-01 RX ORDER — CITALOPRAM HYDROBROMIDE 20 MG/1
TABLET ORAL
Qty: 90 TABLET | Refills: 0 | OUTPATIENT
Start: 2018-08-01

## 2018-08-01 NOTE — TELEPHONE ENCOUNTER
Dr Noe Williamson signed rx for tramadol and citalopram on 7/27/18. This request came as duplicate in both a mychart refill request and a regular refill request on 7/27/18    Pretty Reagan, RN, BSN

## 2018-08-13 ENCOUNTER — OFFICE VISIT (OUTPATIENT)
Dept: FAMILY MEDICINE | Facility: CLINIC | Age: 38
End: 2018-08-13
Payer: COMMERCIAL

## 2018-08-13 VITALS
WEIGHT: 194 LBS | BODY MASS INDEX: 29.5 KG/M2 | HEART RATE: 63 BPM | TEMPERATURE: 98.2 F | OXYGEN SATURATION: 99 % | DIASTOLIC BLOOD PRESSURE: 82 MMHG | SYSTOLIC BLOOD PRESSURE: 131 MMHG | RESPIRATION RATE: 18 BRPM

## 2018-08-13 DIAGNOSIS — F41.1 GENERALIZED ANXIETY DISORDER: Primary | ICD-10-CM

## 2018-08-13 PROCEDURE — 99207 ZZC FOR CODING REVIEW: CPT | Performed by: FAMILY MEDICINE

## 2018-08-13 PROCEDURE — 99214 OFFICE O/P EST MOD 30 MIN: CPT | Performed by: FAMILY MEDICINE

## 2018-08-13 RX ORDER — CITALOPRAM HYDROBROMIDE 10 MG/1
10 TABLET ORAL DAILY
Qty: 30 TABLET | Refills: 1 | Status: SHIPPED | OUTPATIENT
Start: 2018-08-13 | End: 2018-09-17

## 2018-08-13 NOTE — PROGRESS NOTES
SUBJECTIVE:   Emile Navas is a 38 year old male who presents to clinic today for the following health issues:        Medication Followup of Celexa    Taking Medication as prescribed: yes    Side Effects:  None    Medication Helping Symptoms:  yes     He doesn't have depression, it is more that his wife works in mental health and she felt he was anxious. He had been irritable.    He had a period a couple of weeks ago where he went for several days without it and he felt a little dizzy or off, it was disconcerting. It makes him wary to stop it again in the future.      Takes tramadol intermittently. He may go a week without needing tramadol, but then when he works or works out he may need several in a week. It helps his wrist.    His back has been doing well since surgery.    He just went back to work, has a 3 week old at home.    EXAM:  /82 (BP Location: Left arm, Patient Position: Sitting, Cuff Size: Adult Regular)  Pulse 63  Temp 98.2  F (36.8  C) (Tympanic)  Resp 18  Wt 194 lb (88 kg)  SpO2 99%  BMI 29.5 kg/m2  Constitutional: Healthy, alert, no distress   Cardiovascular: RRR. No murmurs   Respiratory: Clear to auscultation   Psych: mood good, affect appropriate, insight and judgment good, function good.    ASSESSMENT    ICD-10-CM    1. Generalized anxiety disorder F41.1 citalopram (CELEXA) 10 MG tablet      Plan:  Patient Instructions   If you don't tolerate the trial of reduction to 10mg due to side effects of dizziness or feeling off, then alternate 10mg one day 20mg another day for 2 weeks before reducing to 10mg daily.  If irritability returns, go back to 20mg daily.     Anxiety stable and in good control, will consider taper/ween.    Chris Williamson MD  Family Medicine Physician

## 2018-08-13 NOTE — MR AVS SNAPSHOT
After Visit Summary   8/13/2018    Emile Navas    MRN: [id number]           Patient Information     Date Of Birth          1980        Visit Information        Provider Department      8/13/2018 10:15 AM Chris Mackey MD [facility]        Today's Diagnoses     Generalized anxiety disorder    -  1      Care Instructions    If you don't tolerate the trial of reduction to 10mg due to side effects of dizziness or feeling off, then alternate 10mg one day 20mg another day for 2 weeks before reducing to 10mg daily.  If irritability returns, go back to 20mg daily.          Follow-ups after your visit        Who to contact     If you have questions or need follow up information about today's clinic visit or your schedule please contact [facility] directly at [phone].  Normal or non-critical lab and imaging results will be communicated to you by MyChart, letter or phone within 4 business days after the clinic has received the results. If you do not hear from us within 7 days, please contact the clinic through [employer] or phone. If you have a critical or abnormal lab result, we will notify you by phone as soon as possible.  Submit refill requests through [employer] or call your pharmacy and they will forward the refill request to us. Please allow 3 business days for your refill to be completed.          Additional Information About Your Visit        MyChart Information     [employer] gives you secure access to your electronic health record. If you see a primary care provider, you can also send messages to your care team and make appointments. If you have questions, please call your primary care clinic.  If you do not have a primary care provider, please call [phone] and they will assist you.        Care EveryWhere ID     This is your Care EveryWhere ID. This could be used by other organizations to access your [facility]  Orthopedic surgery clinic patient visit :    [facility] [place] [facility] [address]  [address] [place] [address]  [phone]  [phone]  12/21/2017 1:50 PM    SUBJECTIVE:  She is  a 88 year old female who presented for pain and bruising in the left shoulder. She has a history of being on warfarin and she was recently taken off warfarin due to the bruising and her left arm. The bruising extends from the shoulder to the elbow anteriorly. She was having pain prior to a fall 3 weeks ago and states that her pain is been present for about 3 months in duration. She has no posterior or anterior shoulder pain but has mostly lateral pain between the shoulder and elbow.    I have reviewed the patient's medications and allergies, past medical, surgical, social and family history, updating these as appropriate.  See Histories section of the EMR for a display of this information.    OBJECTIVE:    PHYSICAL EXAM:  There were no vitals taken for this visit.  Constitutional:  Well developed, well nourished female in no acute distress.  Skin: Warm, dry, intact without rash or lesion.  Neurologic:  Alert & oriented x 3.   Psychiatric:  Speech and behavior appropriate.   Musculoskeletal: Exam of the patient's left shoulder today reveals significant ecchymosis between the shoulder and elbow anteriorly. She has no joint effusion present. She is mildly tender in the shoulder itself but has pain referrable to the area over the insertion of the deltoid. Range of motion of the elbow was full.    IMAGING/DATA:  X-rays reveal no significant arthritis of the glenohumeral joint. There is no elevation of the humeral head with respect to the glenoid. There is no osseous abnormalities.    Assessment:  Left shoulder rotator cuff tear/shoulder contusion with hematoma    Plan:  I believe that she bled significantly from soft tissue injury in the left arm secondary to her warfarin. We  records  [id number]        Your Vitals Were     Pulse Temperature Respirations Pulse Oximetry BMI (Body Mass Index)       63 98.2  F (36.8  C) (Tympanic) 18 99% 29.5 kg/m2        Blood Pressure from Last 3 Encounters:   08/13/18 131/82   01/05/18 119/79   11/21/17 130/83    Weight from Last 3 Encounters:   08/13/18 194 lb (88 kg)   01/05/18 201 lb 12.8 oz (91.5 kg)   11/21/17 204 lb (92.5 kg)              Today, you had the following     No orders found for display         Today's Medication Changes          These changes are accurate as of 8/13/18 11:09 AM.  If you have any questions, ask your nurse or doctor.               These medicines have changed or have updated prescriptions.        Dose/Directions    * citalopram 20 MG tablet   Commonly known as:  celeXA   This may have changed:  Another medication with the same name was added. Make sure you understand how and when to take each.   Used for:  Generalized anxiety disorder   Changed by:  Chris Mackey MD        Dose:  20 mg   Take 1 tablet (20 mg) by mouth daily   Quantity:  90 tablet   Refills:  3       * citalopram 10 MG tablet   Commonly known as:  celeXA   This may have changed:  You were already taking a medication with the same name, and this prescription was added. Make sure you understand how and when to take each.   Used for:  Generalized anxiety disorder   Changed by:  Chris Mackey MD        Dose:  10 mg   Take 1 tablet (10 mg) by mouth daily   Quantity:  30 tablet   Refills:  1       * Notice:  This list has 2 medication(s) that are the same as other medications prescribed for you. Read the directions carefully, and ask your doctor or other care provider to review them with you.         Where to get your medicines      [facility] [address] [place], [place] [address] AT [place] [address]  [address] [facility] [address]     Phone:  [phone]      reviewed options and the rationale for treatment would include therapy time and cortisone injection to the subacromial/glenohumeral joint. She would like to proceed with the injection. We reviewed the rationale for not ordering an MRI as her rotator cuff is likely tore.    Procedure note: Risks, benefits, alternatives and complications to glenohumeral joint corticosteroid injection to the Left shoulder were discussed and the patient would like to proceed. The Left shoulder was prepped using chlorhexidine which was allowed to dry. I used a 22-gauge needle to enter the glenohumeral joint through a posterior approach and 120 mg of Depo-Medrol and 3 cc of quarter percent Marcaine were instilled without difficulty. The needle was withdrawn, the wound cleansed, dry sterile dressings were applied. The patient tolerated the procedure well. Postinjection instructions were given to the patient after this visit.    Return if symptoms worsen or fail to improve.  Instructions noted per AVS/patient instructions.  The patient indicates understanding of these issues and agrees with the plan.    Joey Maria M.D. serves as my supervising physician and was available for questions regarding formulation of diagnosis and plan and review of patient care. If Joey Maria is unavailable, an alternate supervising physician will cover Joey Maria's responsibilities based on established yearly written agreement and daily availability of said physicians.     citalopram 10 MG tablet                Primary Care Provider Office Phone # Fax #    Chris Ruano Noe Williamson [phone]       [address] [facility] [address]        Equal Access to Services     SUSIE FRAZIER : Hadii shelia ku hadyokoo Soomaali, waaxda luqadaha, qaybta kaalmada adeegyada, td caleron denizneetu cabrera laCarloscarroll gonsalves. So [facility] [phone].    ATENCIÓN: Si habla español, tiene a crockett disposición servicios gratuitos de asistencia lingüística. Llame al [phone].    We comply with applicable federal civil rights laws and [place] laws. We do not discriminate on the basis of race, color, national origin, age, disability, sex, sexual orientation, or gender identity.            Thank you!     Thank you for choosing [facility]  for your care. Our goal is always to provide you with excellent care. Hearing back from our patients is one way we can continue to improve our services. Please take a few minutes to complete the written survey that you may receive in the mail after your visit with us. Thank you!             Your Updated Medication List - Protect others around you: Learn how to safely use, store and throw away your medicines at www.disposemymeds.org.          This list is accurate as of 8/13/18 11:09 AM.  Always use your most recent med list.                   Brand Name Dispense Instructions for use Diagnosis    * citalopram 20 MG tablet    celeXA    90 tablet    Take 1 tablet (20 mg) by mouth daily    Generalized anxiety disorder       * citalopram 10 MG tablet    celeXA    30 tablet    Take 1 tablet (10 mg) by mouth daily    Generalized anxiety disorder       traMADol 50 MG tablet    ULTRAM    28 tablet    Take 1 tablet (50 mg) by mouth daily as needed for moderate pain    Lumbar disc disease       * Notice:  This list has 2 medication(s) that are the same as other medications prescribed for you. Read the directions carefully, and ask your doctor or other care  provider to review them with you.

## 2018-08-13 NOTE — PATIENT INSTRUCTIONS
If you don't tolerate the trial of reduction to 10mg due to side effects of dizziness or feeling off, then alternate 10mg one day 20mg another day for 2 weeks before reducing to 10mg daily.  If irritability returns, go back to 20mg daily.

## 2018-09-16 ENCOUNTER — MYC REFILL (OUTPATIENT)
Dept: FAMILY MEDICINE | Facility: CLINIC | Age: 38
End: 2018-09-16

## 2018-09-16 DIAGNOSIS — M51.9 LUMBAR DISC DISEASE: ICD-10-CM

## 2018-09-17 DIAGNOSIS — M51.9 LUMBAR DISC DISEASE: ICD-10-CM

## 2018-09-17 RX ORDER — TRAMADOL HYDROCHLORIDE 50 MG/1
50 TABLET ORAL DAILY PRN
Qty: 28 TABLET | Refills: 0 | Status: SHIPPED | OUTPATIENT
Start: 2018-09-17 | End: 2018-10-15

## 2018-09-17 NOTE — TELEPHONE ENCOUNTER
Faxed rx of traMADol (ULTRAM) 50 MG tablet to [facility] on [address] in [place].    Riky Amor MA

## 2018-09-17 NOTE — TELEPHONE ENCOUNTER
Refill request to MD/primary care provider.  Tramadol last written 8/22/18 for #28      Pretty Reagan RN

## 2018-09-17 NOTE — TELEPHONE ENCOUNTER
Routing to MAs -- this may be a duplicate request, but can you please check on status of the fax and if needed, fax again? Thanks    Michelle Gar, JAMESN, RN  [facility]

## 2018-10-15 ENCOUNTER — MYC REFILL (OUTPATIENT)
Dept: FAMILY MEDICINE | Facility: CLINIC | Age: 38
End: 2018-10-15

## 2018-10-15 DIAGNOSIS — M51.9 LUMBAR DISC DISEASE: ICD-10-CM

## 2018-10-15 RX ORDER — TRAMADOL HYDROCHLORIDE 50 MG/1
50 TABLET ORAL DAILY PRN
Qty: 28 TABLET | Refills: 0 | Status: SHIPPED | OUTPATIENT
Start: 2018-10-15 | End: 2018-11-12

## 2018-10-15 NOTE — TELEPHONE ENCOUNTER
Message from [employer]:  Original authorizing provider: MD Emile Rose would like a refill of the following medications:  traMADol (ULTRAM) 50 MG tablet [Chris Williamson MD]    Preferred pharmacy: [facility] [address] [place] [address] [address] AT [place] [address]    Comment:

## 2018-10-15 NOTE — TELEPHONE ENCOUNTER
I faxed Rx for Tramadol 50 MG tablet to [facility]-[address] [place]  Start date: 10/15/2018    Chanelle Li MA

## 2018-10-15 NOTE — TELEPHONE ENCOUNTER
Pending Prescriptions:                       Disp   Refills    traMADol (ULTRAM) 50 MG tablet            28 tab*0            Sig: Take 1 tablet (50 mg) by mouth daily as needed           for moderate pain    Routing refill request to provider for review/approval because:  Drug not on the [facility] refill protocol       Last Written Prescription Date: 9/17/18  Last Fill Quantity: 28,   # refills: 0  Last Office Visit: 8/13/18  Future Office visit:  None scheduled     Danni Cabrera Registered Nurse   [facility]

## 2018-10-17 RX ORDER — TRAMADOL HYDROCHLORIDE 50 MG/1
TABLET ORAL
Qty: 0.01 TABLET | Refills: 0 | OUTPATIENT
Start: 2018-10-17

## 2018-10-17 NOTE — TELEPHONE ENCOUNTER
Routing to provider - Paul  - providers are asked to deny any controlled substance medication - can you decline refill as duplicate please?    Thank you,  Shimon Alfaro RN

## 2018-11-12 ENCOUNTER — MYC REFILL (OUTPATIENT)
Dept: FAMILY MEDICINE | Facility: CLINIC | Age: 38
End: 2018-11-12

## 2018-11-12 DIAGNOSIS — M51.9 LUMBAR DISC DISEASE: ICD-10-CM

## 2018-11-12 RX ORDER — TRAMADOL HYDROCHLORIDE 50 MG/1
50 TABLET ORAL DAILY PRN
Qty: 28 TABLET | Refills: 0 | Status: SHIPPED | OUTPATIENT
Start: 2018-11-12 | End: 2018-12-06

## 2018-11-12 NOTE — TELEPHONE ENCOUNTER
Message from [employer]:  Original authorizing provider: MD Emile Rose would like a refill of the following medications:  traMADol (ULTRAM) 50 MG tablet [Chris Williamson MD]    Preferred pharmacy: [facility] [address] [place] [address] [address] AT [place] [address]    Comment:

## 2018-11-12 NOTE — TELEPHONE ENCOUNTER
Faxed rx of traMADol (ULTRAM) 50 MG tablet to [facility] [address] in [place].    Riky Amor MA

## 2018-12-06 ENCOUNTER — MYC REFILL (OUTPATIENT)
Dept: FAMILY MEDICINE | Facility: CLINIC | Age: 38
End: 2018-12-06

## 2018-12-06 DIAGNOSIS — M51.9 LUMBAR DISC DISEASE: ICD-10-CM

## 2018-12-06 RX ORDER — TRAMADOL HYDROCHLORIDE 50 MG/1
50 TABLET ORAL DAILY PRN
Qty: 28 TABLET | Refills: 0 | Status: SHIPPED | OUTPATIENT
Start: 2018-12-06 | End: 2019-01-03

## 2018-12-06 NOTE — TELEPHONE ENCOUNTER
TRAMADOL REQUEST:   Dr. Naidu covering provider can you review this refill request last fill was 11/12/18.    Does have an upcoming visit with PCP (AJ) on 1/7/19. Thanks for the review.    Routing refill request to provider for review/approval because:  Drug not on the [facility] refill protocol   Rianna Perdue RN

## 2018-12-06 NOTE — TELEPHONE ENCOUNTER
Message from [employer]:  Original authorizing provider: MD Emile Rose would like a refill of the following medications:  traMADol (ULTRAM) 50 MG tablet [Chris Williamson MD]    Preferred pharmacy: [facility] [address] [place] [address] [address] AT [place] [address]    Comment:

## 2018-12-21 ENCOUNTER — COMMUNICATION - HEALTHEAST (OUTPATIENT)
Dept: HEALTH INFORMATION MANAGEMENT | Facility: CLINIC | Age: 38
End: 2018-12-21

## 2019-01-03 ENCOUNTER — MYC REFILL (OUTPATIENT)
Dept: FAMILY MEDICINE | Facility: CLINIC | Age: 39
End: 2019-01-03

## 2019-01-03 DIAGNOSIS — M51.9 LUMBAR DISC DISEASE: ICD-10-CM

## 2019-01-03 RX ORDER — TRAMADOL HYDROCHLORIDE 50 MG/1
50 TABLET ORAL DAILY PRN
Qty: 28 TABLET | Refills: 0 | Status: CANCELLED | OUTPATIENT
Start: 2019-01-03

## 2019-01-03 NOTE — TELEPHONE ENCOUNTER
Routing refill request to provider for review/approval because:  Drug not on the [facility] refill protocol     Last visit 8/31/2018  Last refill 12/6/2018 #28

## 2019-01-04 DIAGNOSIS — M51.9 LUMBAR DISC DISEASE: ICD-10-CM

## 2019-01-04 RX ORDER — TRAMADOL HYDROCHLORIDE 50 MG/1
TABLET ORAL
Qty: 28 TABLET | Refills: 0 | Status: SHIPPED | OUTPATIENT
Start: 2019-01-04 | End: 2019-01-28

## 2019-01-04 RX ORDER — TRAMADOL HYDROCHLORIDE 50 MG/1
TABLET ORAL
Qty: 28 TABLET | Refills: 0 | OUTPATIENT
Start: 2019-01-04

## 2019-01-04 NOTE — TELEPHONE ENCOUNTER
Rx approved by provider today and faxed script to [facility] on [address] in [place]. Patient notified, please remove pending meds. Thanks!    Orin Escobar

## 2019-01-04 NOTE — TELEPHONE ENCOUNTER
Tramadol      Last Written Prescription Date:  12/6/18  Last Fill Quantity: 28,   # refills: 0  Last Office Visit: 8/13/18  Future Office visit:    Next 5 appointments (look out 90 days)    Jan 07, 2019 11:15 AM CST  [facility] with Chris Williamson MD  [facility] ([facility]) [address] [place] [address]  [phone]           Routing refill request to provider for review/approval because:  Drug not on the [facility], [facility] or [facility] refill protocol or controlled substance

## 2019-01-04 NOTE — TELEPHONE ENCOUNTER
Routing refill request to provider for review/approval because:  Drug not on the [facility] refill protocol     Last visit 8/13/2018  Last refill 12/6/2018

## 2019-01-07 ENCOUNTER — OFFICE VISIT (OUTPATIENT)
Dept: FAMILY MEDICINE | Facility: CLINIC | Age: 39
End: 2019-01-07
Payer: COMMERCIAL

## 2019-01-07 VITALS
BODY MASS INDEX: 29.55 KG/M2 | RESPIRATION RATE: 14 BRPM | WEIGHT: 195 LBS | HEART RATE: 68 BPM | TEMPERATURE: 98.1 F | OXYGEN SATURATION: 98 % | SYSTOLIC BLOOD PRESSURE: 132 MMHG | HEIGHT: 68 IN | DIASTOLIC BLOOD PRESSURE: 80 MMHG

## 2019-01-07 DIAGNOSIS — J02.9 PHARYNGITIS, UNSPECIFIED ETIOLOGY: ICD-10-CM

## 2019-01-07 DIAGNOSIS — Z00.00 ROUTINE HISTORY AND PHYSICAL EXAMINATION OF ADULT: Primary | ICD-10-CM

## 2019-01-07 DIAGNOSIS — S62.001S CLOSED NONDISPLACED FRACTURE OF SCAPHOID OF RIGHT WRIST, UNSPECIFIED PORTION OF SCAPHOID, SEQUELA: ICD-10-CM

## 2019-01-07 LAB
DEPRECATED S PYO AG THROAT QL EIA: NORMAL
SPECIMEN SOURCE: NORMAL

## 2019-01-07 PROCEDURE — 87081 CULTURE SCREEN ONLY: CPT | Performed by: FAMILY MEDICINE

## 2019-01-07 PROCEDURE — 99396 PREV VISIT EST AGE 40-64: CPT | Performed by: FAMILY MEDICINE

## 2019-01-07 PROCEDURE — 87880 STREP A ASSAY W/OPTIC: CPT | Performed by: FAMILY MEDICINE

## 2019-01-07 PROCEDURE — 99213 OFFICE O/P EST LOW 20 MIN: CPT | Mod: 25 | Performed by: FAMILY MEDICINE

## 2019-01-07 ASSESSMENT — ENCOUNTER SYMPTOMS
SHORTNESS OF BREATH: 0
PARESTHESIAS: 0
SORE THROAT: 1
CONSTIPATION: 0
JOINT SWELLING: 0
NAUSEA: 0
EYE PAIN: 0
DIARRHEA: 0
HEARTBURN: 0
COUGH: 0
MYALGIAS: 0
PALPITATIONS: 0
NERVOUS/ANXIOUS: 0
HEADACHES: 0
ABDOMINAL PAIN: 0
HEMATOCHEZIA: 0
FEVER: 0
ARTHRALGIAS: 0
DIZZINESS: 0
ARTHRALGIAS: 1
DYSURIA: 0
CHILLS: 0
HEMATURIA: 0
WEAKNESS: 0
FREQUENCY: 0

## 2019-01-07 ASSESSMENT — MIFFLIN-ST. JEOR: SCORE: 1769.01

## 2019-01-07 NOTE — PROGRESS NOTES
SUBJECTIVE:   CC: Emile Navas is an 38 year old male who presents for preventative health visit.     Physical   Annual:     Getting at least 3 servings of Calcium per day:  Yes    Bi-annual eye exam:  Yes    Dental care twice a year:  Yes    Sleep apnea or symptoms of sleep apnea:  None    Diet:  Carbohydrate counting    Frequency of exercise:  2-3 days/week    Duration of exercise:  30-45 minutes    Taking medications regularly:  Yes    Medication side effects:  None    Additional concerns today:  No    PHQ-2 Total Score: 0    He has continued to have benefit from surgery.    Wrist surgery did not go as well.    Physical Activity: Does a lot of weight training. Had some atrophy in legs as a result of back condition. Elliptical would be he cardio of choice.  Nutrition: Started a low carb diet yesterday. That is his plan until he travels in April.      He has had promotions at work so rather than decrease celexa he is continuing at 20mg. Still working/helping.    Today's PHQ-2 Score:   PHQ-2 ( 1999 [employer]) 1/7/2019   Q1: Little interest or pleasure in doing things 0   Q2: Feeling down, depressed or hopeless 0   PHQ-2 Score 0   Q1: Little interest or pleasure in doing things Not at all   Q2: Feeling down, depressed or hopeless Not at all   PHQ-2 Score 0       Abuse: Current or Past(Physical, Sexual or Emotional)- No  Do you feel safe in your environment? Yes    Social History     Tobacco Use     Smoking status: Never Smoker     Smokeless tobacco: Never Used   Substance Use Topics     Alcohol use: Yes     Comment: Rarely     Alcohol Use 1/7/2019   If you drink alcohol do you typically have greater than 3 drinks per day OR greater than 7 drinks per week? No       Last PSA: No results found for: PSA    Reviewed and updated as needed this visit by clinical staff  Tobacco  Allergies  Meds         Reviewed and updated as needed this visit by Provider          Review of Systems   Constitutional: Negative for chills  "and fever.   HENT: Positive for ear pain and sore throat. Negative for congestion and hearing loss.    Eyes: Negative for pain and visual disturbance.   Respiratory: Negative for cough and shortness of breath.    Cardiovascular: Negative for chest pain, palpitations and peripheral edema.   Gastrointestinal: Negative for abdominal pain, constipation, diarrhea, heartburn, hematochezia and nausea.   Genitourinary: Negative for discharge, dysuria, frequency, genital sores, hematuria, impotence and urgency.   Musculoskeletal: Positive for arthralgias. Negative for joint swelling and myalgias.   Skin: Negative for rash.   Neurological: Negative for dizziness, weakness, headaches and paresthesias.   Psychiatric/Behavioral: Negative for mood changes. The patient is not nervous/anxious.      He continues to have pain in the right wrist since wrist surgery. He has a broken scaphoid that is though to be necrotic. It is painful ongoing. He takes tramadol for this.  Back no longer requires medication.    OBJECTIVE:   /80   Pulse 68   Temp 98.1  F (36.7  C)   Resp 14   Ht 1.727 m (5' 8\")   Wt 88.5 kg (195 lb)   SpO2 98%   BMI 29.65 kg/m      Physical Exam  GENERAL: healthy, alert and no distress  EYES: Eyes grossly normal to inspection, PERRL and conjunctivae and sclerae normal  HENT: ear canals and TM's normal, nose and mouth without ulcers or lesions  NECK: no adenopathy, no asymmetry, masses, or scars and thyroid normal to palpation  RESP: lungs clear to auscultation - no rales, rhonchi or wheezes  CV: regular rate and rhythm, normal S1 S2, no S3 or S4, no murmur, click or rub, no peripheral edema and peripheral pulses strong  ABDOMEN: soft, nontender, no hepatosplenomegaly, no masses and bowel sounds normal  MS: no gross musculoskeletal defects noted, no edema  SKIN: no suspicious lesions or rashes  NEURO: Normal strength and tone, mentation intact and speech normal  PSYCH: mentation appears normal, affect " "normal/bright    Results for orders placed or performed in visit on 01/07/19   Strep, Rapid Screen   Result Value Ref Range    Specimen Description Throat     Rapid Strep A Screen       NEGATIVE: No Group A streptococcal antigen detected by immunoassay, await culture report.        ASSESSMENT/PLAN:   Emile was seen today for physical.    Diagnoses and all orders for this visit:    Routine history and physical examination of adult    Closed nondisplaced fracture of scaphoid of right wrist, unspecified portion of scaphoid, sequela  -     ORTHOPEDICS ADULT REFERRAL    Pharyngitis, unspecified etiology  -     Strep, Rapid Screen  -     Beta strep group A culture    Other orders  -     Cancel: Lipid panel reflex to direct LDL Fasting        COUNSELING:   Reviewed preventive health counseling, as reflected in patient instructions       Regular exercise       Healthy diet/nutrition    BP Readings from Last 1 Encounters:   01/07/19 132/80     Estimated body mass index is 29.65 kg/m  as calculated from the following:    Height as of this encounter: 1.727 m (5' 8\").    Weight as of this encounter: 88.5 kg (195 lb).    I recommend returning for a fasting lab test of hgb and cholesterol/lipid.  You are due for a tetanus this year.   I recommend consultation for another opinion regarding scaphoid fracture and ongoing wrist pain.     reports that  has never smoked. he has never used smokeless tobacco.      Counseling Resources:  ATP IV Guidelines  Pooled Cohorts Equation Calculator  FRAX Risk Assessment  ICSI Preventive Guidelines  Dietary Guidelines for Americans, 2010  USDA's MyPlate  ASA Prophylaxis  Lung CA Screening    Chris Williamson MD  [facility]  "

## 2019-01-07 NOTE — PATIENT INSTRUCTIONS
Preventive Health Recommendations  Male Ages 40 to 49    Yearly exam:             See your health care provider every year in order to  o   Review health changes.   o   Discuss preventive care.    o   Review your medicines if your doctor has prescribed any.    You should be tested each year for STDs (sexually transmitted diseases) if you re at risk.     Have a cholesterol test every 5 years.     Have a colonoscopy (test for colon cancer) if someone in your family has had colon cancer or polyps before age 50.     After age 45, have a diabetes test (fasting glucose). If you are at risk for diabetes, you should have this test every 3 years.      Talk with your health care provider about whether or not a prostate cancer screening test (PSA) is right for you.    Shots: Get a flu shot each year. Get a tetanus shot every 10 years.     Nutrition:    Eat at least 5 servings of fruits and vegetables daily.     Eat whole-grain bread, whole-wheat pasta and brown rice instead of white grains and rice.     Get adequate Calcium and Vitamin D.     Lifestyle    Exercise for at least 150 minutes a week (30 minutes a day, 5 days a week). This will help you control your weight and prevent disease.     Limit alcohol to one drink per day.     No smoking.     Wear sunscreen to prevent skin cancer.     See your dentist every six months for an exam and cleaning.     I recommend returning for a fasting lab test of hgb and cholesterol/lipid.  You are due for a tetanus this year.

## 2019-01-08 LAB
BACTERIA SPEC CULT: NORMAL
SPECIMEN SOURCE: NORMAL

## 2019-01-28 ENCOUNTER — MYC REFILL (OUTPATIENT)
Dept: FAMILY MEDICINE | Facility: CLINIC | Age: 39
End: 2019-01-28

## 2019-01-28 DIAGNOSIS — M51.9 LUMBAR DISC DISEASE: ICD-10-CM

## 2019-01-28 RX ORDER — TRAMADOL HYDROCHLORIDE 50 MG/1
50 TABLET ORAL DAILY PRN
Qty: 28 TABLET | Refills: 0 | Status: SHIPPED | OUTPATIENT
Start: 2019-01-28 | End: 2019-02-25

## 2019-01-28 NOTE — TELEPHONE ENCOUNTER
Requested Prescriptions   Pending Prescriptions Disp Refills     traMADol (ULTRAM) 50 MG tablet 28 tablet 0    There is no refill protocol information for this order        Last visit 1/7/2019  Last refill 1/4/2019 # 28

## 2019-01-29 NOTE — TELEPHONE ENCOUNTER
Faxed rx of traMADol (ULTRAM) 50 MG tablet to [facility] on [address] in [place].    Riky Amor MA

## 2019-02-25 ENCOUNTER — MYC REFILL (OUTPATIENT)
Dept: FAMILY MEDICINE | Facility: CLINIC | Age: 39
End: 2019-02-25

## 2019-02-25 DIAGNOSIS — M51.9 LUMBAR DISC DISEASE: ICD-10-CM

## 2019-02-26 NOTE — TELEPHONE ENCOUNTER
Routing refill request to provider for review/approval because:  Drug not on the [facility] refill protocol   Last visit 1/7/2019  Last refill 1/28/2019 #30

## 2019-02-27 RX ORDER — TRAMADOL HYDROCHLORIDE 50 MG/1
50 TABLET ORAL DAILY PRN
Qty: 28 TABLET | Refills: 0 | Status: SHIPPED | OUTPATIENT
Start: 2019-02-27 | End: 2019-03-20

## 2019-02-27 NOTE — TELEPHONE ENCOUNTER
Prescription faxed to [facility] in [place] at [phone]    Returned call to patient and notified him that prescription has been sent to pharmacy. Patient verbalized understanding    Dotty Cordova, TIFFANIE  Triage Nurse

## 2019-03-18 ENCOUNTER — MYC MEDICAL ADVICE (OUTPATIENT)
Dept: FAMILY MEDICINE | Facility: CLINIC | Age: 39
End: 2019-03-18

## 2019-03-19 ENCOUNTER — MYC REFILL (OUTPATIENT)
Dept: FAMILY MEDICINE | Facility: CLINIC | Age: 39
End: 2019-03-19

## 2019-03-19 ENCOUNTER — E-VISIT (OUTPATIENT)
Dept: FAMILY MEDICINE | Facility: CLINIC | Age: 39
End: 2019-03-19
Payer: COMMERCIAL

## 2019-03-19 DIAGNOSIS — M51.9 LUMBAR DISC DISEASE: ICD-10-CM

## 2019-03-19 PROCEDURE — 99444 ZZC PHYSICIAN ONLINE EVALUATION & MANAGEMENT SERVICE: CPT | Performed by: FAMILY MEDICINE

## 2019-03-19 RX ORDER — TRAMADOL HYDROCHLORIDE 50 MG/1
50 TABLET ORAL DAILY PRN
Qty: 28 TABLET | Refills: 0 | Status: CANCELLED | OUTPATIENT
Start: 2019-03-19

## 2019-03-20 RX ORDER — TRAMADOL HYDROCHLORIDE 50 MG/1
50 TABLET ORAL DAILY PRN
Qty: 28 TABLET | Refills: 0 | Status: SHIPPED | OUTPATIENT
Start: 2019-03-20 | End: 2019-04-22

## 2019-04-08 ENCOUNTER — MYC MEDICAL ADVICE (OUTPATIENT)
Dept: FAMILY MEDICINE | Facility: CLINIC | Age: 39
End: 2019-04-08

## 2019-04-10 NOTE — TELEPHONE ENCOUNTER
Dr. Miller,  Please see patient's MyChart reply    Please advise    Thank You!  Dotty Cordova, TIFFANIE  Triage Nurse

## 2019-04-21 ENCOUNTER — MYC MEDICAL ADVICE (OUTPATIENT)
Dept: FAMILY MEDICINE | Facility: CLINIC | Age: 39
End: 2019-04-21

## 2019-04-22 ENCOUNTER — E-VISIT (OUTPATIENT)
Dept: FAMILY MEDICINE | Facility: CLINIC | Age: 39
End: 2019-04-22
Payer: COMMERCIAL

## 2019-04-22 DIAGNOSIS — M51.9 LUMBAR DISC DISEASE: ICD-10-CM

## 2019-04-22 RX ORDER — TRAMADOL HYDROCHLORIDE 50 MG/1
50 TABLET ORAL DAILY PRN
Qty: 28 TABLET | Refills: 0 | Status: SHIPPED | OUTPATIENT
Start: 2019-04-22 | End: 2019-04-25

## 2019-04-22 NOTE — TELEPHONE ENCOUNTER
Duplicate message see [email or web address] refill request    Closing encounter - no further actions needed at this time    Shimon Alfaro RN

## 2019-04-23 NOTE — TELEPHONE ENCOUNTER
I faxed Rx for Tramadol 50 MG tablet [facility]  Start date: 04/22/2019    Chanelle Li MA

## 2019-04-30 ENCOUNTER — COMMUNICATION - HEALTHEAST (OUTPATIENT)
Dept: HEALTH INFORMATION MANAGEMENT | Facility: CLINIC | Age: 39
End: 2019-04-30

## 2019-05-21 ENCOUNTER — COMMUNICATION - HEALTHEAST (OUTPATIENT)
Dept: HEALTH INFORMATION MANAGEMENT | Facility: CLINIC | Age: 39
End: 2019-05-21

## 2020-03-01 ENCOUNTER — HEALTH MAINTENANCE LETTER (OUTPATIENT)
Age: 40
End: 2020-03-01

## 2020-06-15 ENCOUNTER — TRANSFERRED RECORDS (OUTPATIENT)
Dept: HEALTH INFORMATION MANAGEMENT | Facility: CLINIC | Age: 40
End: 2020-06-15

## 2020-12-14 ENCOUNTER — HEALTH MAINTENANCE LETTER (OUTPATIENT)
Age: 40
End: 2020-12-14

## 2020-12-23 ENCOUNTER — TRANSFERRED RECORDS (OUTPATIENT)
Dept: HEALTH INFORMATION MANAGEMENT | Facility: CLINIC | Age: 40
End: 2020-12-23

## 2021-04-17 ENCOUNTER — HEALTH MAINTENANCE LETTER (OUTPATIENT)
Age: 41
End: 2021-04-17

## 2021-06-19 NOTE — LETTER
Letter by Sebas Naidu at      Author: Sebas Naidu Service: -- Author Type: --    Filed:  Encounter Date: 2019 Status: (Other)         2019       Emile Navas  [address] [place] [address]    Dear Emile Navas:    We are pleased to provide you with secure, online access to medical information for you and your family. Per your request, we have expanded your account to allow access to the records of the following family members:              Scott BETANCUR Yosef (privilege ends on 2030.)     How Do I Login?  1. In your Internet browser, go to [email or web address]  2. Click on Sign Up Now   3. Enter your EnerG2 Activation Code exactly as it appears below. This code will  14 days after it is generated. You will not need to use this code after you have completed the sign-up process. If you do not sign up before the expiration date, you must request a new code.     EnerG2 Activation Code: [email or web address]  Expires: 2019 10:56 AM    4. Enter in your date of birth and zip code.  5. Create a EnerG2 username. Think of one that is secure and easy to remember.  Your username must be between 6 and 20 characters.  6. Create a EnerG2 password. You can change your password at any time. Your password must be between 8 and 45 characters, contain at least two letters and one number, and contain both upper and lower case letters.  7. Choose a security question, enter your answer, and click Next. This can be used to access EnerG2 if you forget your password.   8. Enter a valid e-mail address to receive e-mail notifications when new information is available in EnerG2.  9. Click Sign In.        How Do I Access a Family Member's Account?  10. Select the account you want to access by clicking the [place] with the appropriate patient's name at the top of your screen.   11. You will see a disclaimer page letting you know that you will be viewing a family member's  record. Review the disclaimer and then click Accept Proxy Access Disclaimer to proceed.  12. Once you switch to viewing a family member's record, you can navigate to [facility] pages the same way you would for yourself. You can return to your own account by clicking the [place] at the top of the screen with your name on it.    13. To customize colors and names of the linked accounts, you can select Personalize from the Profile dropdown menu at the top of the screen, then click the Edit button to make changes.      Additional Information  If you have questions, you can e-mail [email or web address] or call [phone] to talk to our [facility] staff. Remember, [facility] is NOT to be used for urgent needs. For medical emergencies, dial 911.

## 2021-06-19 NOTE — LETTER
Letter by Sebas Naidu at      Author: Sebas Naidu Service: -- Author Type: --    Filed:  Encounter Date: 2019 Status: (Other)         May 21, 2019       Emile Navas  [address] [place] [address]    Dear Emile Navas:    We are pleased to provide you with secure, online access to medical information for you and your family. Per your request, we have expanded your account to allow access to the records of the following family members:              Scott BETANCUR Yosef (privilege ends on 2030.)     How Do I Login?  1. In your Internet browser, go to [email or web address]  2. Click on Sign Up Now   3. Enter your Yipit Activation Code exactly as it appears below. This code will  14 days after it is generated. You will not need to use this code after you have completed the sign-up process. If you do not sign up before the expiration date, you must request a new code.     Yipit Activation Code: [email or web address]  Expires: 2019 10:56 AM    4. Enter in your date of birth and zip code.  5. Create a Yipit username. Think of one that is secure and easy to remember.  Your username must be between 6 and 20 characters.  6. Create a Yipit password. You can change your password at any time. Your password must be between 8 and 45 characters, contain at least two letters and one number, and contain both upper and lower case letters.  7. Choose a security question, enter your answer, and click Next. This can be used to access Yipit if you forget your password.   8. Enter a valid e-mail address to receive e-mail notifications when new information is available in Yipit.  9. Click Sign In.        How Do I Access a Family Member's Account?  10. Select the account you want to access by clicking the [place] with the appropriate patient's name at the top of your screen.   11. You will see a disclaimer page letting you know that you will be viewing a family member's  record. Review the disclaimer and then click Accept Proxy Access Disclaimer to proceed.  12. Once you switch to viewing a family member's record, you can navigate to [facility] pages the same way you would for yourself. You can return to your own account by clicking the [place] at the top of the screen with your name on it.    13. To customize colors and names of the linked accounts, you can select Personalize from the Profile dropdown menu at the top of the screen, then click the Edit button to make changes.      Additional Information  If you have questions, you can e-mail [email or web address] or call [phone] to talk to our [facility] staff. Remember, [employer] is NOT to be used for urgent needs. For medical emergencies, dial 911.

## 2021-10-02 ENCOUNTER — HEALTH MAINTENANCE LETTER (OUTPATIENT)
Age: 41
End: 2021-10-02

## 2022-05-14 ENCOUNTER — HEALTH MAINTENANCE LETTER (OUTPATIENT)
Age: 42
End: 2022-05-14

## 2023-01-14 ENCOUNTER — HEALTH MAINTENANCE LETTER (OUTPATIENT)
Age: 43
End: 2023-01-14

## 2023-02-03 ENCOUNTER — TRANSFERRED RECORDS (OUTPATIENT)
Dept: HEALTH INFORMATION MANAGEMENT | Facility: CLINIC | Age: 43
End: 2023-02-03

## 2023-05-15 ENCOUNTER — TRANSFERRED RECORDS (OUTPATIENT)
Dept: HEALTH INFORMATION MANAGEMENT | Facility: CLINIC | Age: 43
End: 2023-05-15

## 2023-06-02 ENCOUNTER — HEALTH MAINTENANCE LETTER (OUTPATIENT)
Age: 43
End: 2023-06-02

## (undated) DEVICE — TUBING SUCTION SOFT 20'X3/16" 0036570

## (undated) DEVICE — SU VICRYL 3-0 PS-1 18" UND J683

## (undated) DEVICE — GLOVE PROTEXIS BLUE W/NEU-THERA 6.5  2D73EB65

## (undated) DEVICE — DRSG ADAPTIC 3X3" 6112

## (undated) DEVICE — GLOVE PROTEXIS POWDER FREE 8.0 ORTHOPEDIC 2D73ET80

## (undated) DEVICE — DRSG GAUZE 4X4" 3033

## (undated) DEVICE — ESU GROUND PAD UNIVERSAL W/O CORD

## (undated) DEVICE — PACK SPINE SM CUSTOM SNE15SSFSK

## (undated) DEVICE — SPONGE SURGIFOAM 50

## (undated) DEVICE — CUSHION INSERT LG PRONE VIEW JACKSON TABLE

## (undated) DEVICE — PREP CHLORAPREP 26ML TINTED ORANGE  260815

## (undated) DEVICE — DRSG STERI STRIP 1/2X4" R1547

## (undated) DEVICE — DRAPE SHEET REV FOLD 3/4 9349

## (undated) DEVICE — SYR 01ML 27GA 0.5" NDL TBC 309623

## (undated) DEVICE — ESU CORD BIPOLAR 12' E0509

## (undated) DEVICE — SYR 10ML FINGER CONTROL W/O NDL 309695

## (undated) DEVICE — GLOVE PROTEXIS POWDER FREE 6.5 ORTHOPEDIC 2D73ET65

## (undated) DEVICE — LINEN TOWEL PACK X5 5464

## (undated) DEVICE — SU VICRYL 1 MO-4 18" J702D

## (undated) DEVICE — GLOVE PROTEXIS BLUE W/NEU-THERA 7.5  2D73EB75

## (undated) DEVICE — DECANTER VIAL 2006S

## (undated) DEVICE — DRAPE POUCH INSTRUMENT 1018

## (undated) DEVICE — SU VICRYL 2-0 CT-1 27" J339H

## (undated) DEVICE — SUCTION MANIFOLD NEPTUNE SGL

## (undated) RX ORDER — BETAMETHASONE SODIUM PHOSPHATE AND BETAMETHASONE ACETATE 3; 3 MG/ML; MG/ML
INJECTION, SUSPENSION INTRA-ARTICULAR; INTRALESIONAL; INTRAMUSCULAR; SOFT TISSUE
Status: DISPENSED
Start: 2017-04-26

## (undated) RX ORDER — LIDOCAINE HYDROCHLORIDE 10 MG/ML
INJECTION, SOLUTION INFILTRATION; PERINEURAL
Status: DISPENSED
Start: 2017-04-26

## (undated) RX ORDER — DEXAMETHASONE SODIUM PHOSPHATE 4 MG/ML
INJECTION, SOLUTION INTRA-ARTICULAR; INTRALESIONAL; INTRAMUSCULAR; INTRAVENOUS; SOFT TISSUE
Status: DISPENSED
Start: 2017-04-26

## (undated) RX ORDER — ONDANSETRON 2 MG/ML
INJECTION INTRAMUSCULAR; INTRAVENOUS
Status: DISPENSED
Start: 2017-04-26

## (undated) RX ORDER — FENTANYL CITRATE 50 UG/ML
INJECTION, SOLUTION INTRAMUSCULAR; INTRAVENOUS
Status: DISPENSED
Start: 2017-04-26

## (undated) RX ORDER — CEFAZOLIN SODIUM 2 G/100ML
INJECTION, SOLUTION INTRAVENOUS
Status: DISPENSED
Start: 2017-04-26

## (undated) RX ORDER — PROPOFOL 10 MG/ML
INJECTION, EMULSION INTRAVENOUS
Status: DISPENSED
Start: 2017-04-26

## (undated) RX ORDER — HYDROMORPHONE HYDROCHLORIDE 1 MG/ML
INJECTION, SOLUTION INTRAMUSCULAR; INTRAVENOUS; SUBCUTANEOUS
Status: DISPENSED
Start: 2017-04-26

## (undated) RX ORDER — OXYCODONE HYDROCHLORIDE 5 MG/1
TABLET ORAL
Status: DISPENSED
Start: 2017-04-26

## (undated) RX ORDER — GLYCOPYRROLATE 0.2 MG/ML
INJECTION, SOLUTION INTRAMUSCULAR; INTRAVENOUS
Status: DISPENSED
Start: 2017-04-26

## (undated) RX ORDER — LIDOCAINE HYDROCHLORIDE 20 MG/ML
INJECTION, SOLUTION EPIDURAL; INFILTRATION; INTRACAUDAL; PERINEURAL
Status: DISPENSED
Start: 2017-04-26

## (undated) RX ORDER — BUPIVACAINE HYDROCHLORIDE AND EPINEPHRINE 5; 5 MG/ML; UG/ML
INJECTION, SOLUTION EPIDURAL; INTRACAUDAL; PERINEURAL
Status: DISPENSED
Start: 2017-04-26